# Patient Record
Sex: MALE | Race: WHITE | Employment: OTHER | URBAN - METROPOLITAN AREA
[De-identification: names, ages, dates, MRNs, and addresses within clinical notes are randomized per-mention and may not be internally consistent; named-entity substitution may affect disease eponyms.]

---

## 2017-09-01 ENCOUNTER — HOSPITAL ENCOUNTER (OUTPATIENT)
Dept: OTHER | Age: 67
Discharge: HOME | End: 2017-09-30
Attending: INTERNAL MEDICINE | Admitting: INTERNAL MEDICINE

## 2017-09-09 PROBLEM — F17.200 SMOKER: Status: ACTIVE | Noted: 2017-09-09

## 2017-09-09 PROBLEM — I10 HTN (HYPERTENSION): Status: ACTIVE | Noted: 2017-09-09

## 2017-09-09 PROBLEM — I26.99 PE (PULMONARY THROMBOEMBOLISM) (HCC): Status: ACTIVE | Noted: 2017-09-09

## 2017-09-09 PROBLEM — D75.1 POLYCYTHEMIA: Status: ACTIVE | Noted: 2017-09-09

## 2017-09-14 ENCOUNTER — ANTI-COAG VISIT (OUTPATIENT)
Dept: PHARMACY | Age: 67
End: 2017-09-14

## 2017-09-14 DIAGNOSIS — I26.99 PE (PULMONARY THROMBOEMBOLISM) (HCC): Primary | ICD-10-CM

## 2017-09-14 LAB
INR BLD: 3.2
PROTIME: 38.9 SECONDS

## 2017-09-18 ENCOUNTER — ANTI-COAG VISIT (OUTPATIENT)
Dept: PHARMACY | Age: 67
End: 2017-09-18

## 2017-09-18 DIAGNOSIS — I26.99 PE (PULMONARY THROMBOEMBOLISM) (HCC): ICD-10-CM

## 2017-09-18 LAB — INR BLD: 2.5

## 2017-09-25 ENCOUNTER — ANTI-COAG VISIT (OUTPATIENT)
Dept: PHARMACY | Age: 67
End: 2017-09-25

## 2017-09-25 DIAGNOSIS — I26.99 PE (PULMONARY THROMBOEMBOLISM) (HCC): ICD-10-CM

## 2017-09-25 LAB
INR BLD: 4.2
PROTIME: 50.3 SECONDS

## 2017-10-02 ENCOUNTER — ANTI-COAG VISIT (OUTPATIENT)
Dept: PHARMACY | Age: 67
End: 2017-10-02

## 2017-10-02 DIAGNOSIS — I26.99 PE (PULMONARY THROMBOEMBOLISM) (HCC): ICD-10-CM

## 2017-10-02 LAB
INR BLD: 3
PROTIME: 36.4 SECONDS

## 2017-10-02 NOTE — PROGRESS NOTES
Mr. Acacia Solis is a 61 y.o. y/o male with history of PE   He presents today for anticoagulation monitoring and adjustment. Pertinent PMH: HTN  Patient Reported Findings:  Yes     No  [x]   []       Patient verifies current dosing regimen as listed  []   [x]       S/S bleeding/bruising/swelling/SOB  []   [x]       Blood in urine or stool  []   [x]       Procedures scheduled in the future at this time  []   [x]       Missed Dose  []   [x]       Extra Dose  []   [x]       Change in medications  []   [x]       Change in health/diet/appetite  []   [x]       Change in alcohol use Alcohol in the evening is normally 2 shots of Crown Apple and 1 beer.  []   [x]       Change in activity  [x]   []       Hospital admission from 9/9/17-9/12 for fresh PE  []   [x]       Emergency department visit  []   [x]       Other complaints  [x]   []       Tobacco use--He smokes 1/2 ppd    Clinical Outcomes:  Yes     No  []   [x]       Major bleeding event  []   [x]       Thromboembolic event    Duration of warfarin Therapy: 6 months from 9/11/17 ending approx. 3/11/17  INR Range:  2.0-3.0  Patient states that he does not eat any salads or vegetables because he had the impression that he \"can't have any of the green vegetables\". Instructed patient the key is to be consistent with vegetables, not that he can not have them. INR is 3.0 today   Continue same weekly dose of 2.5mg daily. Patient agrees to add in 2 salads this week. He may be interested in more vegetables since he now realizes he can have them. Recheck INR in 1 week  Consider ordering 2.5mg tablet.     Referring pulmonologist is Dr. Rehana Small  INR (no units)   Date Value   10/02/2017 3   09/25/2017 4.2   09/18/2017 2.5   09/14/2017 3.2

## 2017-10-02 NOTE — MR AVS SNAPSHOT
After Visit Summary             Rangel Holman   10/2/2017 2:15 PM   Anti-coag visit    Description:  Male : 1950   Provider:  YUMIKO Naranjo YOSVANY [facility]   Department:  [address]/[facility] [employer]              Your Follow-Up and Future Appointments         Below is a list of your follow-up and future appointments. This may not be a complete list as you may have made appointments directly with providers that we are not aware of or your providers may have made some for you. Please call your providers to confirm appointments. It is important to keep your appointments. Please bring your current insurance card, photo ID, co-pay, and all medication bottles to your appointment. If self-pay, payment is expected at the time of service. Your To-Do List     Future Appointments Provider Department Dept Phone    10/9/2017 2:15 PM [facility] [phone]         Information from Your Visit        Department     Name Address Phone Fax    [address]/[facility] [address] [place] [address] [phone]      You Were Seen for:         Comments    PE (pulmonary thromboembolism) [facility])   [[id number]]         Anticoagulation Summary as of 10/2/2017              Today's INR 3    Next INR check 10/9/2017      Description           Continue same weekly dose of 2.5 mg (1/2 tablet) daily. Vital Signs     Smoking Status                   Current Every Day Smoker              Medications and Orders      Your Current Medications Are              warfarin (COUMADIN) 5 MG tablet Goal INR 2-3    lisinopril (PRINIVIL;ZESTRIL) 10 MG tablet Take 1 tablet by mouth daily      Allergies           No Known Allergies      We Ordered/Performed the following           Protime-INR     Comments: This external order was created through the results console.          Result Summary for Protime-INR Result Information     Status          Final result (Resulted: 10/2/2017  2:02 PM)           10/2/2017  2:02 PM      Component Results     Component Value Ref Range & Units Status    INR 3  Final    Protime 36.4 seconds Final               Additional Information        Basic Information     Date Of Birth Sex Race Ethnicity Preferred Language    1950 Male White Non-/Non  English      Problem List as of 10/2/2017                 Bilateral pulmonary embolism (HCC)    Pulmonary infarct (HCC)    PE (pulmonary thromboembolism) (HCC)    Polycythemia    HTN (hypertension)    Smoker      Preventive Care        Date Due    Hepatitis C screening is recommended for all adults regardless of risk factors born between [facility] at least once (lifetime) who have never been tested. 1950    HIV screening is recommended for all people regardless of risk factors  aged 15-65 years at least once (lifetime) who have never been HIV tested. 11/16/1967    Tetanus Combination Vaccine (1 - Tdap) 11/16/1971    Pneumococcal Vaccine - Pneumovax for adults aged 19-64 years with: chronic heart disease, chronic lung disease, diabetes mellitus, alcoholism, chronic liver disease, or cigarette smoking. (1 of 1 - PPSV23) 11/16/1971    Cholesterol Screening 11/16/1992    Colonoscopy 11/16/2002    Zoster Vaccine 11/16/2012    Yearly Flu Vaccine (1) 9/1/2017            [employer] Signup           [employer] allows you to send messages to your doctor, view your test results, renew your prescriptions, schedule appointments, view visit notes, and more. How Do I Sign Up? 1. In your Internet browser, go to [email or web address]  2. Click on the Sign Up Now link in the Sign In box. You will see the New Member Sign Up page. 3. Enter your [employer] Access Code exactly as it appears below. You will not need to use this code after youve completed the sign-up process.  If you do not sign up before the expiration date, you must request a new code. [employer] Access Code: [email or web address]  Expires: 11/11/2017 12:28 PM    4. Enter your Social Security Number ([id number]) and Date of Birth (mm/dd/yyyy) as indicated and click Submit. You will be taken to the next sign-up page. 5. Create a [employer] ID. This will be your [employer] login ID and cannot be changed, so think of one that is secure and easy to remember. 6. Create a [employer] password. You can change your password at any time. 7. Enter your Password Reset Question and Answer. This can be used at a later time if you forget your password. 8. Enter your e-mail address. You will receive e-mail notification when new information is available in [address]. 9. Click Sign Up. You can now view your medical record. Additional Information  If you have questions, please contact the physician practice where you receive care. Remember, [employer] is NOT to be used for urgent needs. For medical emergencies, dial 911. For questions regarding your [employer] account call [phone]. If you have a clinical question, please call your doctor's office.         October 2017 Details    Denisse Catherine Nohelia Tue Wed Thu Fri Sat     1               2      2.5 mg   See details      3      2.5 mg         4      2.5 mg         5      2.5 mg         6      2.5 mg         7      2.5 mg           8      2.5 mg         9      2.5 mg         10               11               12               13               14                 15               16               17               18               19               20               21                 22               23               24               25               26               27               28                 29               30               31                    Date Details   10/02 This INR check       Date of next INR:  10/9/2017         How to take your warfarin dose

## 2017-10-09 ENCOUNTER — ANTI-COAG VISIT (OUTPATIENT)
Dept: PHARMACY | Age: 67
End: 2017-10-09

## 2017-10-09 DIAGNOSIS — I26.99 PE (PULMONARY THROMBOEMBOLISM) (HCC): ICD-10-CM

## 2017-10-09 LAB
INR BLD: 2.9
PROTIME: 35.3 SECONDS

## 2017-10-09 NOTE — PROGRESS NOTES
Mr. Kathie Colon is a 61 y.o. y/o male with history of PE   He presents today for anticoagulation monitoring and adjustment. Pertinent PMH: HTN  Patient Reported Findings:  Yes     No  [x]   []       Patient verifies current dosing regimen as listed  []   [x]       S/S bleeding/bruising/swelling/SOB  []   [x]       Blood in urine or stool  []   [x]       Procedures scheduled in the future at this time  []   [x]       Missed Dose  []   [x]       Extra Dose  []   [x]       Change in medications  []   [x]       Change in health/diet/appetite  []   [x]       Change in alcohol use Alcohol in the evening is normally 2 shots of Crown Apple and 1 beer.  []   [x]       Change in activity  [x]   []       Hospital admission from 9/9/17-9/12 for fresh PE  []   [x]       Emergency department visit  []   [x]       Other complaints  [x]   []       Tobacco use--He smokes 1/2 ppd    Clinical Outcomes:  Yes     No  []   [x]       Major bleeding event  []   [x]       Thromboembolic event    Duration of warfarin Therapy: 6 months from 9/11/17 ending approx. 3/11/17  INR Range:  2.0-3.0  Patient states that he does not eat any salads or vegetables because he had the impression that he \"can't have any of the green vegetables\". Instructed patient the key is to be consistent with vegetables, not that he can not have them. INR is 2.9 today   Continue same weekly dose of 2.5mg daily.    Patient agrees to add in 2 salads this week had only been doing 1 salad a week  Recheck INR in 2 weeks, 10/23  Consider ordering 2.5mg tablet (still has a lot of 5 mg tablets left)    Referring pulmonologist is Dr. Dong Sy  INR (no units)   Date Value   10/09/2017 2.9   10/02/2017 3   09/25/2017 4.2   09/18/2017 2.5

## 2017-10-10 ENCOUNTER — TELEPHONE (OUTPATIENT)
Dept: PHARMACY | Age: 67
End: 2017-10-10

## 2017-10-10 NOTE — TELEPHONE ENCOUNTER
Pt called regarding Lisinopril prescription that was prescribed while in patient. He said it was called into [facility] ( confirmed in pt's chart) . [facility] does not have any information on this medication only warfarin. Explained to pt I would try to contact the MD ( Dr Eligio Pastor) to see what happened. Left v/m on hospitalist line asking to call pt. Spoke to pt he actually had Lisinopril filled at the [facility] OP Rx  while in house at [facility] . Advised him to call the [facility] OP pharmacy ( gave him their number ) and have them refill the prescription or transfer to . Salima Delacruz.

## 2017-10-23 ENCOUNTER — ANTI-COAG VISIT (OUTPATIENT)
Dept: PHARMACY | Age: 67
End: 2017-10-23

## 2017-10-23 DIAGNOSIS — I26.99 PE (PULMONARY THROMBOEMBOLISM) (HCC): ICD-10-CM

## 2017-10-23 LAB
INR BLD: 2.2
PROTIME: 26.6 SECONDS

## 2017-11-01 ENCOUNTER — HOSPITAL ENCOUNTER (OUTPATIENT)
Dept: OTHER | Age: 67
Discharge: HOME | End: 2017-11-30
Attending: INTERNAL MEDICINE | Admitting: INTERNAL MEDICINE

## 2017-11-13 ENCOUNTER — ANTI-COAG VISIT (OUTPATIENT)
Dept: PHARMACY | Age: 67
End: 2017-11-13

## 2017-11-13 DIAGNOSIS — I26.99 PE (PULMONARY THROMBOEMBOLISM) (HCC): ICD-10-CM

## 2017-11-13 LAB
INR BLD: 2.9
PROTIME: 35.2 SECONDS

## 2017-12-01 ENCOUNTER — HOSPITAL ENCOUNTER (OUTPATIENT)
Dept: OTHER | Age: 67
Discharge: HOME | End: 2017-12-31
Attending: INTERNAL MEDICINE | Admitting: INTERNAL MEDICINE

## 2017-12-11 ENCOUNTER — ANTI-COAG VISIT (OUTPATIENT)
Dept: PHARMACY | Age: 67
End: 2017-12-11

## 2017-12-11 DIAGNOSIS — I26.99 PE (PULMONARY THROMBOEMBOLISM) (HCC): ICD-10-CM

## 2017-12-11 LAB
INR BLD: 2.2
PROTIME: 25.9 SECONDS

## 2017-12-11 RX ORDER — WARFARIN SODIUM 2.5 MG/1
2.5 TABLET ORAL DAILY
COMMUNITY
End: 2021-03-23 | Stop reason: DRUGHIGH

## 2017-12-11 NOTE — PROGRESS NOTES
Mr. Fredy Velazquez is a 74 y.o. y/o male with history of PE   He presents today for anticoagulation monitoring and adjustment. Pertinent PMH: HTN  Patient Reported Findings:  Yes     No  [x]   []       Patient verifies current dosing regimen as listed  []   [x]       S/S bleeding/bruising/swelling/SOB  []   [x]       Blood in urine or stool  []   [x]       Procedures scheduled in the future at this time  []   [x]       Missed Dose  []   [x]       Extra Dose  []   [x]       Change in medications  [x]   []       Change in health/diet/appetite- normally eats salad twice a week, but has not been eating vegetables the past few weeks   []   [x]       Change in alcohol use Alcohol in the evening is normally 2 shots of Crown Apple and 1 beer.  []   [x]       Change in activity  []   [x]       Hospital admission   []   [x]       Emergency department visit  []   [x]       Other complaints  [x]   []       Tobacco use--He smokes 1/2 ppd    Clinical Outcomes:  Yes     No  []   [x]       Major bleeding event  []   [x]       Thromboembolic event    Duration of warfarin Therapy: 6 months from 9/11/17 ending approx. 3/11/17  INR Range:  2.0-3.0    INR is 2.2 today   Continue same weekly dose of 2.5mg daily.    Recheck INR in 4 weeks, 1/8  Consider ordering 2.5mg tablet (still has a lot of 5 mg tablets left)    Referring pulmonologist is Dr. Emeterio Huber  INR (no units)   Date Value   12/11/2017 2.2   11/13/2017 2.9   10/23/2017 2.2   10/09/2017 2.9

## 2017-12-27 PROBLEM — J44.9 COPD (CHRONIC OBSTRUCTIVE PULMONARY DISEASE) (HCC): Status: ACTIVE | Noted: 2017-12-27

## 2017-12-27 PROBLEM — E87.1 HYPONATREMIA: Status: ACTIVE | Noted: 2017-12-27

## 2017-12-27 PROBLEM — D72.829 LEUKOCYTOSIS: Status: ACTIVE | Noted: 2017-12-27

## 2017-12-27 PROBLEM — K81.0 ACUTE CHOLECYSTITIS: Status: ACTIVE | Noted: 2017-12-27

## 2017-12-27 PROBLEM — E86.0 DEHYDRATION: Status: ACTIVE | Noted: 2017-12-27

## 2018-01-01 ENCOUNTER — HOSPITAL ENCOUNTER (OUTPATIENT)
Dept: OTHER | Age: 68
Discharge: HOME | End: 2018-01-31
Attending: INTERNAL MEDICINE | Admitting: INTERNAL MEDICINE

## 2018-01-08 ENCOUNTER — ANTI-COAG VISIT (OUTPATIENT)
Dept: PHARMACY | Age: 68
End: 2018-01-08

## 2018-01-08 DIAGNOSIS — I26.99 PE (PULMONARY THROMBOEMBOLISM) (HCC): ICD-10-CM

## 2018-01-08 LAB
INR BLD: 1.3
PROTIME: 15.9 SECONDS

## 2018-01-19 ENCOUNTER — OFFICE VISIT (OUTPATIENT)
Dept: PULMONOLOGY | Age: 68
End: 2018-01-19

## 2018-01-19 VITALS
SYSTOLIC BLOOD PRESSURE: 179 MMHG | RESPIRATION RATE: 18 BRPM | HEART RATE: 83 BPM | WEIGHT: 123 LBS | DIASTOLIC BLOOD PRESSURE: 97 MMHG | HEIGHT: 67 IN | BODY MASS INDEX: 19.3 KG/M2 | OXYGEN SATURATION: 99 %

## 2018-01-19 DIAGNOSIS — J44.9 COPD, SEVERITY TO BE DETERMINED (HCC): ICD-10-CM

## 2018-01-19 DIAGNOSIS — D75.1 POLYCYTHEMIA: ICD-10-CM

## 2018-01-19 DIAGNOSIS — Z72.0 NICOTINE ABUSE: ICD-10-CM

## 2018-01-19 DIAGNOSIS — I26.99 PE (PULMONARY THROMBOEMBOLISM) (HCC): ICD-10-CM

## 2018-01-19 DIAGNOSIS — Z09 HOSPITAL DISCHARGE FOLLOW-UP: Primary | ICD-10-CM

## 2018-01-19 PROCEDURE — 99214 OFFICE O/P EST MOD 30 MIN: CPT | Performed by: INTERNAL MEDICINE

## 2018-01-19 NOTE — PROGRESS NOTES
HPI: Hospital follow-up, pulmonary embolus, COPD  Patient was seen during hospital stay in September 2017 later in December  Patient was diagnosed with acute pulmonary embolus in September and was started on Coumadin  He was found to have parasitemia which thought to be due to chronic hypoxia  Patient also have a drinker  Patient required lap cholecystectomy in December and he was seen again preop and did well postoperatively  He is on room air and feels well  He has been on Coumadin since September and managed by the Coumadin clinic here  He denies any shortness of breath or dyspnea on exertion  He denies any productive cough, diaphoresis or hemoptysis  No prior pulmonary function test  CTA in September 2017 was read as  EXAMINATION:   CTA OF THE CHEST 9/9/2017 2:16 pm       TECHNIQUE:   CTA of the chest was performed after the administration of intravenous   contrast.  Multiplanar reformatted images are provided for review.  MIP   images are provided for review. Dose modulation, iterative reconstruction,   and/or weight based adjustment of the mA/kV was utilized to reduce the   radiation dose to as low as reasonably achievable.       COMPARISON:   None.       HISTORY:   ORDERING PHYSICIAN PROVIDED HISTORY: chest pain   TECHNOLOGIST PROVIDED HISTORY:   Technologist Provided Reason for Exam: Rib Pain  pt c/o pain left lower ribs   x 3 days. Worse with deep breath, cough.  Denies injury, fever   Acuity: Acute   Type of Encounter: Initial       History of polycythemia       FINDINGS:   Pulmonary Arteries: Pulmonary arteries are adequately opacified for   evaluation.  There are bilateral central pulmonary arterial emboli which   extend into many of the segmental and subsegmental, most extensive in the   lower lobes, greater on the left.  Main pulmonary artery is within normal   limits in caliber.       RV/LV ratio is approximately 0.9.  There is no reflux of contrast into the   intrahepatic inferior vena cava.     Mediastinum: No mediastinal adenopathy.  Coronary arterial and aortic   calcifications.  No aortic dissection.       Lungs/pleura: The central airways are patent.  Calcified posterior right   lower lobe granulomas are present. Belinda Child is peripheral posterior left lower   lobe airspace disease associated with a tiny left pleural effusion.       Upper Abdomen: Limited images the upper abdomen show a large 1.9 cm stone   within the gallbladder lumen.  Although the gallbladder not particularly   distended; there is some ill definition of the right upper quadrant   pericholecystic fat.       There is moderate to severe narrowing of the superior mesenteric artery by   noncalcified mural thrombus.       Soft Tissues/Bones: No acute or focal bony abnormality.           Impression   Bilateral pulmonary emboli including central emboli extending into segmental   and subsegmental branches.  RV/LV ratio is approximately 0.9.       Left lower lobe pulmonary infarct, trace left pleural effusion.       Moderate to severe narrowing of the proximal superior mesenteric artery by   noncalcified plaque.  A dedicated CT a of the abdominal aorta is recommended.       Cholelithiasis. Belinda Child is ill definition of the pericholecystic right upper   quadrant mesenteric fat.  Correlation for acute and/or chronic cholecystitis   is recommended. Past Medical History:   Diagnosis Date    ETOH abuse     drinks 3-4 beers and 2 shots of liquor per day    HTN (hypertension) 09/09/2017    Started Lisinopril 10 mg daily 9/10/17    Pneumonia involving left lung 09/09/2017    Polycythemia 09/09/2017    Pulmonary embolism, bilateral ([address] 09/09/2017    Tobacco abuse     1 pack per day     Current Outpatient Prescriptions   Medication Sig Dispense Refill    warfarin (COUMADIN) 2.5 MG tablet Take 2.5 mg by mouth daily       No current facility-administered medications for this visit. Review of Systems   Constitutional: Negative. tracheal deviation present. No thyromegaly present. Cardiovascular: Normal rate, regular rhythm, S1&S2 and intact distal pulses. Pulmonary/Chest: Effort normal and breath sounds normal. No stridor. No respiratory distress. No wheezes, rhonchi, rales or tenderness. Abdominal: Soft. Bowel sounds are normal, no shifting dullness, no distension and no mass. No tenderness. No rebound and no guarding. Musculoskeletal: Normal range of motion. No edema and no tenderness. Lymphadenopathy:    No cervical adenopathy. No axillary adenopathy. Neurological: Alert and oriented. Normal reflexes. No cranial nerve deficit. Normal muscle tone. Coordination normal.   Skin: Skin is warm and dry. No rash noted. No erythema. Psychiatric: Normal mood and affect. Behavior is normal. Thought content normal.        Assessment:    1. Hospital discharge follow-up     2. Polycythemia     3. PE (pulmonary thromboembolism) ([facility]     4. COPD, severity to be determined ([facility]     5.  Nicotine abuse             Plan:  · I discussed with patient the above diagnosis in details and reviewed his recent hospital stay and related test results  · I recommended continuing Coumadin treatment and follow-up with hematology  · I will order full pulmonary function tests for evaluation of his COPD  · He denies respiratory symptoms at this time with good functional status  · I encouraged him to quit smoking  · Return to see in 3 months

## 2018-01-22 ENCOUNTER — ANTI-COAG VISIT (OUTPATIENT)
Dept: PHARMACY | Age: 68
End: 2018-01-22

## 2018-01-22 DIAGNOSIS — I26.99 PE (PULMONARY THROMBOEMBOLISM) (HCC): ICD-10-CM

## 2018-01-22 LAB
INR BLD: 2.2
PROTIME: 25.9 SECONDS

## 2018-02-01 ENCOUNTER — HOSPITAL ENCOUNTER (OUTPATIENT)
Dept: OTHER | Age: 68
Discharge: HOME | End: 2018-02-28
Attending: INTERNAL MEDICINE | Admitting: INTERNAL MEDICINE

## 2018-02-05 ENCOUNTER — ANTI-COAG VISIT (OUTPATIENT)
Dept: PHARMACY | Age: 68
End: 2018-02-05

## 2018-02-05 DIAGNOSIS — I26.99 PE (PULMONARY THROMBOEMBOLISM) (HCC): ICD-10-CM

## 2018-02-05 LAB
INR BLD: 1.8
PROTIME: 21.6 SECONDS

## 2018-02-19 ENCOUNTER — ANTI-COAG VISIT (OUTPATIENT)
Dept: PHARMACY | Age: 68
End: 2018-02-19

## 2018-02-19 DIAGNOSIS — I26.99 PE (PULMONARY THROMBOEMBOLISM) (HCC): ICD-10-CM

## 2018-02-19 LAB
INR BLD: 2
PROTIME: 23.5 SECONDS

## 2018-02-19 NOTE — PROGRESS NOTES
Mr. Aparna De Leon is a 74 y.o. y/o male with history of PE   He presents today for anticoagulation monitoring and adjustment. Pertinent PMH: HTN  Patient Reported Findings:  Yes     No  [x]   []       Patient verifies current dosing regimen as listed- uses the paperwork to check off each time takes warfarin   []   [x]       S/S bleeding/bruising/swelling/SOB  []   [x]       Blood in urine or stool  []   [x]       Procedures scheduled in the future at this time  []   [x]       Missed Dose  []   [x]       Extra Dose  []   [x]       Change in medications  [x]   []       Change in health/diet/appetite-Has green beans and potatoes almost every day. States that he has been eating asparagus and salads in addition to normal diet. []   [x]       Change in alcohol use Alcohol in the evening is normally 2 shots of Crown Apple and 1 beer.  []   [x]       Change in activity  []   [x]       Hospital admission     []   [x]       Emergency department visit  []   [x]       Other complaints  [x]   []       Tobacco use--He smokes 1/2 ppd    Clinical Outcomes:  Yes     No  []   [x]       Major bleeding event  []   [x]       Thromboembolic event    Duration of warfarin Therapy: 6 months from 9/11/17 ending approx. 3/11/17. Will See referring physician Dr. Kelvin Osei, on 4/19/18 to discuss continued therapy  INR Range:  2.0-3.0    INR is 2 today despite dose increase, and d/t inc vit k   Continue weekly dose of 3.75mg on Mon & Fri and 2.5 mg all other days  Recheck INR in 3 weeks, 3/12.     Referring pulmonologist is Dr. Kelvin Osei  INR (no units)   Date Value   02/19/2018 2   02/05/2018 1.8   01/22/2018 2.2   01/08/2018 1.3

## 2018-03-01 ENCOUNTER — HOSPITAL ENCOUNTER (OUTPATIENT)
Dept: OTHER | Age: 68
Discharge: HOME | End: 2018-03-31
Attending: INTERNAL MEDICINE | Admitting: INTERNAL MEDICINE

## 2018-03-12 ENCOUNTER — ANTI-COAG VISIT (OUTPATIENT)
Dept: PHARMACY | Age: 68
End: 2018-03-12

## 2018-03-12 DIAGNOSIS — I26.99 PE (PULMONARY THROMBOEMBOLISM) (HCC): ICD-10-CM

## 2018-03-12 LAB
INR BLD: 1.9
PROTIME: 22.5 SECONDS

## 2018-03-26 ENCOUNTER — ANTI-COAG VISIT (OUTPATIENT)
Dept: PHARMACY | Age: 68
End: 2018-03-26

## 2018-03-26 DIAGNOSIS — I26.99 PE (PULMONARY THROMBOEMBOLISM) (HCC): ICD-10-CM

## 2018-03-26 LAB
INR BLD: 2.2
PROTIME: 25.8 SECONDS

## 2018-03-26 NOTE — PROGRESS NOTES
Mr. Melanie Prieto is a 74 y.o. y/o male with history of PE   He presents today for anticoagulation monitoring and adjustment. Pertinent PMH: HTN  Patient Reported Findings:  Yes     No  [x]   []       Patient verifies current dosing regimen as listed- uses the paperwork to check off each time takes warfarin   []   [x]       S/S bleeding/bruising/swelling/SOB  []   [x]       Blood in urine or stool  []   [x]       Procedures scheduled in the future at this time  []   [x]       Missed Dose  []   [x]       Extra Dose  []   [x]       Change in medications  []   [x]       Change in health/diet/appetite-Has green beans and potatoes almost every day. States that he has been eating asparagus and salads in addition to normal diet. []   [x]       Change in alcohol use Alcohol in the evening is normally 2 shots of Crown Apple and 1 beer.  []   [x]       Change in activity  []   [x]       Hospital admission     []   [x]       Emergency department visit  []   [x]       Other complaints  [x]   []       Tobacco use--He smokes 1/2 ppd    Clinical Outcomes:  Yes     No  []   [x]       Major bleeding event  []   [x]       Thromboembolic event    Duration of warfarin Therapy: 6 months from 9/11/17 ending approx. 3/11/17. Will See referring physician Dr. Osvaldo López, on 4/19/18 to discuss continued therapy  INR Range:  2.0-3.0    INR is 2.2 today   Continue weekly dose of 3.75mg on Mon Wed & Fri and 2.5 mg all other days  Called refill in to op pharmacy  Advised that if Dr. Osvaldo López d/c warfarin at f/u appt to contact clinic and will cancel appt  Recheck INR in 4 weeks, 4/23.     Referring pulmonologist is Dr. Osvaldo López  INR (no units)   Date Value   03/26/2018 2.2   03/12/2018 1.9   02/19/2018 2   02/05/2018 1.8

## 2018-04-01 ENCOUNTER — HOSPITAL ENCOUNTER (OUTPATIENT)
Dept: OTHER | Age: 68
Discharge: HOME | End: 2018-04-30
Attending: INTERNAL MEDICINE | Admitting: INTERNAL MEDICINE

## 2018-04-11 PROBLEM — E86.0 DEHYDRATION: Status: RESOLVED | Noted: 2017-12-27 | Resolved: 2018-04-11

## 2018-04-19 ENCOUNTER — TELEPHONE (OUTPATIENT)
Dept: PULMONOLOGY | Age: 68
End: 2018-04-19

## 2018-04-19 ENCOUNTER — OFFICE VISIT (OUTPATIENT)
Dept: PULMONOLOGY | Age: 68
End: 2018-04-19

## 2018-04-19 VITALS
SYSTOLIC BLOOD PRESSURE: 202 MMHG | HEART RATE: 85 BPM | WEIGHT: 131 LBS | RESPIRATION RATE: 18 BRPM | OXYGEN SATURATION: 98 % | DIASTOLIC BLOOD PRESSURE: 110 MMHG | HEIGHT: 67 IN | BODY MASS INDEX: 20.56 KG/M2

## 2018-04-19 DIAGNOSIS — Z72.0 NICOTINE ABUSE: ICD-10-CM

## 2018-04-19 DIAGNOSIS — I26.99 BILATERAL PULMONARY EMBOLISM (HCC): Primary | ICD-10-CM

## 2018-04-19 DIAGNOSIS — D75.1 POLYCYTHEMIA: ICD-10-CM

## 2018-04-19 DIAGNOSIS — J44.9 COPD, SEVERITY TO BE DETERMINED (HCC): ICD-10-CM

## 2018-04-19 PROCEDURE — 99213 OFFICE O/P EST LOW 20 MIN: CPT | Performed by: INTERNAL MEDICINE

## 2018-04-23 ENCOUNTER — ANTI-COAG VISIT (OUTPATIENT)
Dept: PHARMACY | Age: 68
End: 2018-04-23

## 2018-04-23 DIAGNOSIS — I26.99 PE (PULMONARY THROMBOEMBOLISM) (HCC): ICD-10-CM

## 2018-04-23 LAB
INR BLD: 2.3
PROTIME: 27.1 SECONDS

## 2018-05-01 ENCOUNTER — HOSPITAL ENCOUNTER (OUTPATIENT)
Dept: OTHER | Age: 68
Discharge: HOME | End: 2018-05-31
Attending: INTERNAL MEDICINE | Admitting: INTERNAL MEDICINE

## 2018-05-21 ENCOUNTER — ANTI-COAG VISIT (OUTPATIENT)
Dept: PHARMACY | Age: 68
End: 2018-05-21

## 2018-05-21 DIAGNOSIS — I26.99 PE (PULMONARY THROMBOEMBOLISM) (HCC): ICD-10-CM

## 2018-05-21 LAB
INR BLD: 2
PROTIME: 24.3 SECONDS

## 2018-05-22 ENCOUNTER — TELEPHONE (OUTPATIENT)
Dept: PHARMACY | Age: 68
End: 2018-05-22

## 2018-06-01 ENCOUNTER — HOSPITAL ENCOUNTER (OUTPATIENT)
Dept: OTHER | Age: 68
Discharge: HOME | End: 2018-06-30
Attending: INTERNAL MEDICINE | Admitting: INTERNAL MEDICINE

## 2018-06-18 ENCOUNTER — ANTI-COAG VISIT (OUTPATIENT)
Dept: PHARMACY | Age: 68
End: 2018-06-18

## 2018-06-18 DIAGNOSIS — I26.99 PE (PULMONARY THROMBOEMBOLISM) (HCC): ICD-10-CM

## 2018-06-18 LAB
INR BLD: 2.6
PROTIME: 31.1 SECONDS

## 2018-07-01 ENCOUNTER — HOSPITAL ENCOUNTER (OUTPATIENT)
Dept: OTHER | Age: 68
Discharge: HOME | End: 2018-07-31
Attending: INTERNAL MEDICINE | Admitting: INTERNAL MEDICINE

## 2018-07-16 ENCOUNTER — ANTI-COAG VISIT (OUTPATIENT)
Dept: PHARMACY | Age: 68
End: 2018-07-16

## 2018-07-16 DIAGNOSIS — I26.99 PE (PULMONARY THROMBOEMBOLISM) (HCC): ICD-10-CM

## 2018-07-16 LAB
INR BLD: 3.2
PROTIME: 38.2 SECONDS

## 2018-08-01 ENCOUNTER — HOSPITAL ENCOUNTER (OUTPATIENT)
Dept: OTHER | Age: 68
Discharge: HOME | End: 2018-08-31
Attending: INTERNAL MEDICINE | Admitting: INTERNAL MEDICINE

## 2018-08-13 ENCOUNTER — ANTI-COAG VISIT (OUTPATIENT)
Dept: PHARMACY | Age: 68
End: 2018-08-13

## 2018-08-13 DIAGNOSIS — I26.99 PE (PULMONARY THROMBOEMBOLISM) (HCC): ICD-10-CM

## 2018-08-13 LAB
INR BLD: 3
PROTIME: 35.8 SECONDS

## 2018-09-01 ENCOUNTER — HOSPITAL ENCOUNTER (OUTPATIENT)
Dept: OTHER | Age: 68
Discharge: HOME | End: 2018-09-01
Attending: INTERNAL MEDICINE | Admitting: INTERNAL MEDICINE

## 2018-09-10 ENCOUNTER — ANTI-COAG VISIT (OUTPATIENT)
Dept: PHARMACY | Age: 68
End: 2018-09-10

## 2018-09-10 DIAGNOSIS — I26.99 PE (PULMONARY THROMBOEMBOLISM) (HCC): ICD-10-CM

## 2018-09-10 LAB
INR BLD: 4.1
PROTIME: 48.9 SECONDS

## 2018-09-27 ENCOUNTER — ANTI-COAG VISIT (OUTPATIENT)
Dept: PHARMACY | Age: 68
End: 2018-09-27
Payer: MEDICARE

## 2018-09-27 DIAGNOSIS — I26.99 PE (PULMONARY THROMBOEMBOLISM) (HCC): ICD-10-CM

## 2018-09-27 LAB — INTERNATIONAL NORMALIZATION RATIO, POC: 4

## 2018-09-27 PROCEDURE — 85610 PROTHROMBIN TIME: CPT

## 2018-09-27 PROCEDURE — 99212 OFFICE O/P EST SF 10 MIN: CPT

## 2018-09-27 NOTE — TELEPHONE ENCOUNTER
Warfarin prescription phoned into [facility] to [address] [facility] under Dr. Giovanni Conde  Warfarin 2.5 mg tabs  Take 3.75 mg on Mon and 2.5 mg all other days of the week  90 days   2 refills

## 2018-09-28 NOTE — PROGRESS NOTES
Jak Pereira from Dr. Bridget Arriaga office states that per last pt's visit on 9/24/2018, warfarin therapy was changed to indefinitely. Faxed blank collaborative to be filled out, signed and returned to clinic to MD office.

## 2018-10-11 ENCOUNTER — ANTI-COAG VISIT (OUTPATIENT)
Dept: PHARMACY | Age: 68
End: 2018-10-11
Payer: MEDICARE

## 2018-10-11 DIAGNOSIS — I26.99 PE (PULMONARY THROMBOEMBOLISM) (HCC): ICD-10-CM

## 2018-10-11 LAB — INTERNATIONAL NORMALIZATION RATIO, POC: 2.5

## 2018-10-11 PROCEDURE — 99211 OFF/OP EST MAY X REQ PHY/QHP: CPT

## 2018-10-11 PROCEDURE — 85610 PROTHROMBIN TIME: CPT

## 2018-11-01 ENCOUNTER — ANTI-COAG VISIT (OUTPATIENT)
Dept: PHARMACY | Age: 68
End: 2018-11-01
Payer: MEDICARE

## 2018-11-01 DIAGNOSIS — I26.99 PE (PULMONARY THROMBOEMBOLISM) (HCC): ICD-10-CM

## 2018-11-01 LAB — INTERNATIONAL NORMALIZATION RATIO, POC: 3.2

## 2018-11-01 PROCEDURE — 85610 PROTHROMBIN TIME: CPT

## 2018-11-01 PROCEDURE — 99211 OFF/OP EST MAY X REQ PHY/QHP: CPT

## 2018-11-29 ENCOUNTER — ANTI-COAG VISIT (OUTPATIENT)
Dept: PHARMACY | Age: 68
End: 2018-11-29
Payer: MEDICARE

## 2018-11-29 DIAGNOSIS — I26.99 PE (PULMONARY THROMBOEMBOLISM) (HCC): ICD-10-CM

## 2018-11-29 LAB — INTERNATIONAL NORMALIZATION RATIO, POC: 2.9

## 2018-11-29 PROCEDURE — 85610 PROTHROMBIN TIME: CPT

## 2018-11-29 PROCEDURE — 99211 OFF/OP EST MAY X REQ PHY/QHP: CPT

## 2018-12-27 ENCOUNTER — ANTI-COAG VISIT (OUTPATIENT)
Dept: PHARMACY | Age: 68
End: 2018-12-27
Payer: MEDICARE

## 2018-12-27 DIAGNOSIS — I26.99 PE (PULMONARY THROMBOEMBOLISM) (HCC): ICD-10-CM

## 2018-12-27 LAB — INTERNATIONAL NORMALIZATION RATIO, POC: 3.1

## 2018-12-27 PROCEDURE — 99211 OFF/OP EST MAY X REQ PHY/QHP: CPT

## 2018-12-27 PROCEDURE — 85610 PROTHROMBIN TIME: CPT

## 2018-12-27 NOTE — PROGRESS NOTES
Mr. Hiren Neumann is a 79 y.o. y/o male with history of PE   He presents today for anticoagulation monitoring and adjustment. Pertinent PMH: HTN  Patient Reported Findings:  Yes     No  [x]   []       Patient verifies current dosing regimen as listed- uses the paperwork to check off each time takes warfarin   []   [x]       S/S bleeding/bruising/swelling/SOB  []   [x]       Blood in urine or stool  []   [x]       Procedures scheduled in the future at this time   []   [x]       Missed Dose  []   [x]       Extra Dose  []   [x]       Change in medications  []   [x]       Change in health/diet/appetite-Has green beans and potatoes almost every day. Still eating salads in addition to normal diet.   []   [x]       Change in alcohol use Alcohol in the evening is normally 2 shots of Crown Apple and 1 beer.   []   [x]       Change in activity  []   [x]       Hospital admission     []   [x]       Emergency department visit  []   [x]       Other complaints  []   [x]       Tobacco use--He smokes 1/2 ppd    Clinical Outcomes:  Yes     No  []   [x]       Major bleeding event  []   [x]       Thromboembolic event    Duration of warfarin Therapy: indefinitely  INR Range:  2.0-3.0    INR is 3.1 today  Continue weekly dose of 3.75mg on Mon and 2.5 mg all other days   Refilled warfarin at op pharmacy  Recheck INR in 4 weeks, 1/24    Referring is Dr. Tyesha Ingram  INR (no units)   Date Value   12/27/2018 3.1   11/29/2018 2.9   11/01/2018 3.2   10/11/2018 2.5   09/10/2018 4.1   08/13/2018 3   07/16/2018 3.2   06/18/2018 2.6

## 2019-01-24 ENCOUNTER — ANTI-COAG VISIT (OUTPATIENT)
Dept: PHARMACY | Age: 69
End: 2019-01-24
Payer: MEDICARE

## 2019-01-24 DIAGNOSIS — I26.99 PE (PULMONARY THROMBOEMBOLISM) (HCC): ICD-10-CM

## 2019-01-24 LAB — INTERNATIONAL NORMALIZATION RATIO, POC: 2.1

## 2019-01-24 PROCEDURE — 85610 PROTHROMBIN TIME: CPT

## 2019-01-24 PROCEDURE — 99211 OFF/OP EST MAY X REQ PHY/QHP: CPT

## 2019-02-21 ENCOUNTER — ANTI-COAG VISIT (OUTPATIENT)
Dept: PHARMACY | Age: 69
End: 2019-02-21
Payer: MEDICARE

## 2019-02-21 DIAGNOSIS — I26.99 PE (PULMONARY THROMBOEMBOLISM) (HCC): ICD-10-CM

## 2019-02-21 LAB — INTERNATIONAL NORMALIZATION RATIO, POC: 2.4

## 2019-02-21 PROCEDURE — 85610 PROTHROMBIN TIME: CPT

## 2019-02-21 PROCEDURE — 99211 OFF/OP EST MAY X REQ PHY/QHP: CPT

## 2019-03-28 ENCOUNTER — ANTI-COAG VISIT (OUTPATIENT)
Dept: PHARMACY | Age: 69
End: 2019-03-28
Payer: MEDICARE

## 2019-03-28 DIAGNOSIS — I26.99 PE (PULMONARY THROMBOEMBOLISM) (HCC): ICD-10-CM

## 2019-03-28 LAB — INTERNATIONAL NORMALIZATION RATIO, POC: 3.5

## 2019-03-28 PROCEDURE — 99212 OFFICE O/P EST SF 10 MIN: CPT

## 2019-03-28 PROCEDURE — 85610 PROTHROMBIN TIME: CPT

## 2019-04-18 ENCOUNTER — ANTI-COAG VISIT (OUTPATIENT)
Dept: PHARMACY | Age: 69
End: 2019-04-18
Payer: MEDICARE

## 2019-04-18 DIAGNOSIS — I26.99 PE (PULMONARY THROMBOEMBOLISM) (HCC): ICD-10-CM

## 2019-04-18 LAB — INTERNATIONAL NORMALIZATION RATIO, POC: 3.7

## 2019-04-18 PROCEDURE — 99212 OFFICE O/P EST SF 10 MIN: CPT

## 2019-04-18 PROCEDURE — 85610 PROTHROMBIN TIME: CPT

## 2019-04-18 NOTE — PROGRESS NOTES
Mr. Jaqueline Braun is a 79 y.o. y/o male with history of PE   He presents today for anticoagulation monitoring and adjustment. Pertinent PMH: HTN  Patient Reported Findings:  Yes     No  [x]   []       Patient verifies current dosing regimen as listed- uses the paperwork to check off each time takes warfarin   []   [x]       S/S bleeding/bruising/swelling/SOB  []   [x]       Blood in urine or stool  []   [x]       Procedures scheduled in the future at this time   []   [x]       Missed Dose  []   [x]       Extra Dose  []   [x]       Change in medications  [x]   []       Change in health/diet/appetite-Has green beans and potatoes almost every day/ has returned to eating salads twice a week   []   [x]       Change in alcohol use Alcohol in the evening is normally 2 shots of Crown Apple and 1 beer.   []   [x]       Change in activity  []   [x]       Hospital admission     []   [x]       Emergency department visit  []   [x]       Other complaints  []   [x]       Tobacco use--He smokes 1/2 ppd    Clinical Outcomes:  Yes     No  []   [x]       Major bleeding event  []   [x]       Thromboembolic event    Duration of warfarin Therapy: indefinitely  INR Range:  2.0-3.0    INR is 3.7 today    Since still supratherapeutic, hold dose tomorrow then decrease weekly dose to 2.5 mg daily (6.7% dec)  Encouraged to maintain a consistency of vegetables/salads.   Recheck INR in 2 weeks, 5/2    Referring is Dr. Pauline Mendez  INR (no units)   Date Value   04/18/2019 3.7   03/28/2019 3.5   02/21/2019 2.4   01/24/2019 2.1   09/10/2018 4.1   08/13/2018 3   07/16/2018 3.2   06/18/2018 2.6

## 2019-05-02 ENCOUNTER — ANTI-COAG VISIT (OUTPATIENT)
Dept: PHARMACY | Age: 69
End: 2019-05-02
Payer: MEDICARE

## 2019-05-02 DIAGNOSIS — I26.99 PE (PULMONARY THROMBOEMBOLISM) (HCC): ICD-10-CM

## 2019-05-02 LAB — INTERNATIONAL NORMALIZATION RATIO, POC: 2.9

## 2019-05-02 PROCEDURE — 85610 PROTHROMBIN TIME: CPT

## 2019-05-02 PROCEDURE — 99211 OFF/OP EST MAY X REQ PHY/QHP: CPT

## 2019-05-02 NOTE — PROGRESS NOTES
Mr. Jose Finney is a 79 y.o. y/o male with history of PE   He presents today for anticoagulation monitoring and adjustment. Pertinent PMH: HTN  Patient Reported Findings:  Yes     No  [x]   []       Patient verifies current dosing regimen as listed- uses the paperwork to check off each time takes warfarin   []   [x]       S/S bleeding/bruising/swelling/SOB  []   [x]       Blood in urine or stool  []   [x]       Procedures scheduled in the future at this time   []   [x]       Missed Dose  []   [x]       Extra Dose  []   [x]       Change in medications  []   [x]       Change in health/diet/appetite-Has green beans and potatoes almost every day/ has returned to eating salads twice a week    []   [x]       Change in alcohol use Alcohol in the evening is normally 2 shots of Crown Apple and 1 beer.   []   [x]       Change in activity  []   [x]       Hospital admission     []   [x]       Emergency department visit  []   [x]       Other complaints  []   [x]       Tobacco use--He smokes 1/2 ppd    Clinical Outcomes:  Yes     No  []   [x]       Major bleeding event  []   [x]       Thromboembolic event    Duration of warfarin Therapy: indefinitely  INR Range:  2.0-3.0    INR is 2.9 today    Continue weekly dose of 2.5 mg daily  Encouraged to maintain a consistency of vegetables/salads.   Recheck INR in 3 weeks, 5/23    Referring is Dr. Maikol Hinds  INR (no units)   Date Value   04/18/2019 3.7   03/28/2019 3.5   02/21/2019 2.4   01/24/2019 2.1   09/10/2018 4.1   08/13/2018 3   07/16/2018 3.2   06/18/2018 2.6

## 2019-05-23 ENCOUNTER — ANTI-COAG VISIT (OUTPATIENT)
Dept: PHARMACY | Age: 69
End: 2019-05-23
Payer: MEDICARE

## 2019-05-23 DIAGNOSIS — I26.99 PE (PULMONARY THROMBOEMBOLISM) (HCC): ICD-10-CM

## 2019-05-23 LAB — INTERNATIONAL NORMALIZATION RATIO, POC: 3.6

## 2019-05-23 PROCEDURE — 99211 OFF/OP EST MAY X REQ PHY/QHP: CPT

## 2019-05-23 PROCEDURE — 85610 PROTHROMBIN TIME: CPT

## 2019-05-23 NOTE — PROGRESS NOTES
Mr. Kyle Ch is a 79 y.o. y/o male with history of PE   He presents today for anticoagulation monitoring and adjustment. Pertinent PMH: HTN  Patient Reported Findings:  Yes     No  [x]   []       Patient verifies current dosing regimen as listed- uses the paperwork to check off each time takes warfarin   []   [x]       S/S bleeding/bruising/swelling/SOB  []   [x]       Blood in urine or stool  []   [x]       Procedures scheduled in the future at this time   []   [x]       Missed Dose  []   [x]       Extra Dose  []   [x]       Change in medications  []   [x]       Change in health/diet/appetite- Has green beans and potatoes almost every day/ has returned to eating salads twice a week    []   [x]       Change in alcohol use Alcohol in the evening is normally 2 shots of Crown Apple and 1 beer.   []   [x]       Change in activity  []   [x]       Hospital admission     []   [x]       Emergency department visit  []   [x]       Other complaints  []   [x]       Tobacco use--He smokes 1/2 ppd    Clinical Outcomes:  Yes     No  []   [x]       Major bleeding event  []   [x]       Thromboembolic event    Duration of warfarin Therapy: indefinitely  INR Range:  2.0-3.0    INR is 3.6 today    Since returned to supratherapeutic, decrease weekly dose to 1.25 mg on Mon and 2.5 mg all other days of the week (7.1% dec)  Encouraged to maintain a consistency of vegetables/salads.   Recheck INR in 2 weeks, 6/6    Referring is Dr. Sharee Cadena  INR (no units)   Date Value   05/23/2019 3.6   05/02/2019 2.9   04/18/2019 3.7   03/28/2019 3.5   09/10/2018 4.1   08/13/2018 3   07/16/2018 3.2   06/18/2018 2.6

## 2019-06-06 ENCOUNTER — ANTI-COAG VISIT (OUTPATIENT)
Dept: PHARMACY | Age: 69
End: 2019-06-06
Payer: MEDICARE

## 2019-06-06 DIAGNOSIS — I26.99 PE (PULMONARY THROMBOEMBOLISM) (HCC): ICD-10-CM

## 2019-06-06 LAB — INTERNATIONAL NORMALIZATION RATIO, POC: 2.7

## 2019-06-06 PROCEDURE — 85610 PROTHROMBIN TIME: CPT

## 2019-06-06 PROCEDURE — 99211 OFF/OP EST MAY X REQ PHY/QHP: CPT

## 2019-06-06 NOTE — PROGRESS NOTES
Mr. Jose Chaves is a 79 y.o. y/o male with history of PE   He presents today for anticoagulation monitoring and adjustment. Pertinent PMH: HTN  Patient Reported Findings:  Yes     No  [x]   []       Patient verifies current dosing regimen as listed- uses the paperwork to check off each time takes warfarin   []   [x]       S/S bleeding/bruising/swelling/SOB  []   [x]       Blood in urine or stool  []   [x]       Procedures scheduled in the future at this time   []   [x]       Missed Dose  []   [x]       Extra Dose  []   [x]       Change in medications  []   [x]       Change in health/diet/appetite- Has green beans and potatoes almost every day/ has returned to eating salads twice a week    []   [x]       Change in alcohol use Alcohol in the evening is normally 2 shots of Crown Apple and 1 beer.   []   [x]       Change in activity  []   [x]       Hospital admission   []   [x]       Emergency department visit  []   [x]       Other complaints  []   [x]       Tobacco use--He smokes 1/2 ppd    Clinical Outcomes:  Yes     No  []   [x]       Major bleeding event  []   [x]       Thromboembolic event    Duration of warfarin Therapy: indefinitely  INR Range:  2.0-3.0    INR is 2.7 today    Continue weekly dose of 1.25 mg on Mon and 2.5 mg all other days of the week  Encouraged to maintain a consistency of vegetables/salads.   Needs refill at next appt   Recheck INR in 3 weeks, 6/27    Referring is Dr. Alondra Butt  INR (no units)   Date Value   06/06/2019 2.7   05/23/2019 3.6   05/02/2019 2.9   04/18/2019 3.7   09/10/2018 4.1   08/13/2018 3   07/16/2018 3.2   06/18/2018 2.6

## 2019-06-27 ENCOUNTER — ANTI-COAG VISIT (OUTPATIENT)
Dept: PHARMACY | Age: 69
End: 2019-06-27
Payer: MEDICARE

## 2019-06-27 DIAGNOSIS — I26.99 PE (PULMONARY THROMBOEMBOLISM) (HCC): ICD-10-CM

## 2019-06-27 LAB — INTERNATIONAL NORMALIZATION RATIO, POC: 3.3

## 2019-06-27 PROCEDURE — 99211 OFF/OP EST MAY X REQ PHY/QHP: CPT

## 2019-06-27 PROCEDURE — 85610 PROTHROMBIN TIME: CPT

## 2019-06-27 NOTE — TELEPHONE ENCOUNTER
Warfarin prescription phoned into [facility] to [address] [facility] under Dr. Recio Hence  Warfarin 2.5 mg tabs  Take 1.25 mg on Mon and 2.5 mg all other days of the week  90 days   2 refills

## 2019-06-27 NOTE — PROGRESS NOTES
Mr. Beatriz Ball is a 79 y.o. y/o male with history of PE   He presents today for anticoagulation monitoring and adjustment. Pertinent PMH: HTN  Patient Reported Findings:  Yes     No  [x]   []       Patient verifies current dosing regimen as listed- uses the paperwork to check off each time takes warfarin   []   [x]       S/S bleeding/bruising/swelling/SOB  []   [x]       Blood in urine or stool  []   [x]       Procedures scheduled in the future at this time   []   [x]       Missed Dose  []   [x]       Extra Dose  []   [x]       Change in medications  []   [x]       Change in health/diet/appetite- Has green beans and potatoes almost every day/ has returned to eating salads twice a week --> states he has diminished salad intake    []   [x]       Change in alcohol use Alcohol in the evening is normally 2 shots of Crown Apple and 1 beer.   []   [x]       Change in activity  []   [x]       Hospital admission   []   [x]       Emergency department visit  []   [x]       Other complaints  []   [x]       Tobacco use--He smokes 1/2 ppd    Clinical Outcomes:  Yes     No  []   [x]       Major bleeding event  []   [x]       Thromboembolic event    Duration of warfarin Therapy: indefinitely  INR Range:  2.0-3.0    INR is 3.3 today    Since plans to resume salad intake, take 1.25 mg tomorrow then continue weekly dose of 1.25 mg on Mon and 2.5 mg all other days of the week  Encouraged to maintain a consistency of vegetables/salads.   Refilled warfarin at op pharmacy  Recheck INR in 4 weeks, 7/25    Referring is Dr. Purdy Both  INR (no units)   Date Value   06/27/2019 3.3   06/06/2019 2.7   05/23/2019 3.6   05/02/2019 2.9   09/10/2018 4.1   08/13/2018 3   07/16/2018 3.2   06/18/2018 2.6

## 2019-07-18 ENCOUNTER — HOSPITAL ENCOUNTER (OUTPATIENT)
Dept: CT IMAGING | Age: 69
Discharge: HOME | End: 2019-07-18
Payer: MEDICARE

## 2019-07-18 DIAGNOSIS — F17.200 SMOKER: ICD-10-CM

## 2019-07-18 DIAGNOSIS — I26.09 PULMONARY EMBOLISM WITH ACUTE COR PULMONALE, UNSPECIFIED CHRONICITY, UNSPECIFIED PULMONARY EMBOLISM TYPE (HCC): ICD-10-CM

## 2019-07-18 DIAGNOSIS — Z87.891 HISTORY OF SMOKING 30 OR MORE PACK YEARS: ICD-10-CM

## 2019-07-18 DIAGNOSIS — J44.9 COPD, SEVERITY TO BE DETERMINED (HCC): ICD-10-CM

## 2019-07-18 DIAGNOSIS — Z72.0 NICOTINE ABUSE: ICD-10-CM

## 2019-07-18 PROCEDURE — G0297 LDCT FOR LUNG CA SCREEN: HCPCS

## 2019-07-25 ENCOUNTER — ANTI-COAG VISIT (OUTPATIENT)
Dept: PHARMACY | Age: 69
End: 2019-07-25
Payer: MEDICARE

## 2019-07-25 DIAGNOSIS — I26.99 PE (PULMONARY THROMBOEMBOLISM) (HCC): ICD-10-CM

## 2019-07-25 LAB — INTERNATIONAL NORMALIZATION RATIO, POC: 4.6

## 2019-07-25 PROCEDURE — 99211 OFF/OP EST MAY X REQ PHY/QHP: CPT

## 2019-07-25 PROCEDURE — 85610 PROTHROMBIN TIME: CPT

## 2019-08-08 ENCOUNTER — ANTI-COAG VISIT (OUTPATIENT)
Dept: PHARMACY | Age: 69
End: 2019-08-08
Payer: MEDICARE

## 2019-08-08 DIAGNOSIS — I26.99 PE (PULMONARY THROMBOEMBOLISM) (HCC): ICD-10-CM

## 2019-08-08 LAB — INTERNATIONAL NORMALIZATION RATIO, POC: 3

## 2019-08-08 PROCEDURE — 99211 OFF/OP EST MAY X REQ PHY/QHP: CPT

## 2019-08-08 PROCEDURE — 85610 PROTHROMBIN TIME: CPT

## 2019-08-08 NOTE — PROGRESS NOTES
Mr. Elizabeth Toscano is a 79 y.o. y/o male with history of PE   He presents today for anticoagulation monitoring and adjustment. Pertinent PMH: HTN  Patient Reported Findings:  Yes     No  [x]   []       Patient verifies current dosing regimen as listed- uses the paperwork to check off each time takes warfarin   []   [x]       S/S bleeding/bruising/swelling/SOB  []   [x]       Blood in urine or stool  []   [x]       Procedures scheduled in the future at this time   []   [x]       Missed Dose  []   [x]       Extra Dose  []   [x]       Change in medications  []   [x]       Change in health/diet/appetite- Has green beans and potatoes almost every day/ has returned to eating salads twice a week   [x]   []       Change in alcohol use Alcohol in the evening is normally 2 shots of Crown Apple and 1 beer. --> states that he has been drinking a little less than normal    []   [x]       Change in activity  []   [x]       Hospital admission   []   [x]       Emergency department visit  []   [x]       Other complaints  []   [x]       Tobacco use--He smokes 1/2 ppd    Clinical Outcomes:  Yes     No  []   [x]       Major bleeding event  []   [x]       Thromboembolic event    Duration of warfarin Therapy: indefinitely  INR Range:  2.0-3.0    INR is 3 today    Continue weekly dose of 1.25 mg on Mon, Wed and Fri and 2.5 mg all other days of the week  Encouraged to maintain a consistency of vegetables/salads.   Recheck INR in 3 weeks, 8/29    Referring is Dr. Joyce Gray  INR (no units)   Date Value   08/08/2019 3   07/25/2019 4.6   06/27/2019 3.3   06/06/2019 2.7   09/10/2018 4.1   08/13/2018 3   07/16/2018 3.2   06/18/2018 2.6

## 2019-08-29 ENCOUNTER — ANTI-COAG VISIT (OUTPATIENT)
Dept: PHARMACY | Age: 69
End: 2019-08-29
Payer: MEDICARE

## 2019-08-29 DIAGNOSIS — I26.99 PE (PULMONARY THROMBOEMBOLISM) (HCC): ICD-10-CM

## 2019-08-29 LAB — INTERNATIONAL NORMALIZATION RATIO, POC: 3.1

## 2019-08-29 PROCEDURE — 99211 OFF/OP EST MAY X REQ PHY/QHP: CPT

## 2019-08-29 PROCEDURE — 85610 PROTHROMBIN TIME: CPT

## 2019-09-19 ENCOUNTER — ANTI-COAG VISIT (OUTPATIENT)
Dept: PHARMACY | Age: 69
End: 2019-09-19
Payer: MEDICARE

## 2019-09-19 DIAGNOSIS — I26.99 PE (PULMONARY THROMBOEMBOLISM) (HCC): ICD-10-CM

## 2019-09-19 LAB — INTERNATIONAL NORMALIZATION RATIO, POC: 2.3

## 2019-09-19 PROCEDURE — 85610 PROTHROMBIN TIME: CPT

## 2019-09-19 PROCEDURE — 99211 OFF/OP EST MAY X REQ PHY/QHP: CPT

## 2019-10-17 ENCOUNTER — ANTI-COAG VISIT (OUTPATIENT)
Dept: PHARMACY | Age: 69
End: 2019-10-17
Payer: MEDICARE

## 2019-10-17 DIAGNOSIS — I26.99 PE (PULMONARY THROMBOEMBOLISM) (HCC): ICD-10-CM

## 2019-10-17 LAB — INTERNATIONAL NORMALIZATION RATIO, POC: 2.6

## 2019-10-17 PROCEDURE — 99211 OFF/OP EST MAY X REQ PHY/QHP: CPT

## 2019-10-17 PROCEDURE — 85610 PROTHROMBIN TIME: CPT

## 2019-11-14 ENCOUNTER — ANTI-COAG VISIT (OUTPATIENT)
Dept: PHARMACY | Age: 69
End: 2019-11-14
Payer: MEDICARE

## 2019-11-14 DIAGNOSIS — I26.99 PE (PULMONARY THROMBOEMBOLISM) (HCC): ICD-10-CM

## 2019-11-14 LAB — INTERNATIONAL NORMALIZATION RATIO, POC: 3.4

## 2019-11-14 PROCEDURE — 99211 OFF/OP EST MAY X REQ PHY/QHP: CPT

## 2019-11-14 PROCEDURE — 85610 PROTHROMBIN TIME: CPT

## 2019-12-12 ENCOUNTER — ANTI-COAG VISIT (OUTPATIENT)
Dept: PHARMACY | Age: 69
End: 2019-12-12
Payer: MEDICARE

## 2019-12-12 DIAGNOSIS — I26.99 PE (PULMONARY THROMBOEMBOLISM) (HCC): ICD-10-CM

## 2019-12-12 LAB — INTERNATIONAL NORMALIZATION RATIO, POC: 3.5

## 2019-12-12 PROCEDURE — 85610 PROTHROMBIN TIME: CPT

## 2019-12-12 PROCEDURE — 99211 OFF/OP EST MAY X REQ PHY/QHP: CPT

## 2019-12-31 ENCOUNTER — ANTI-COAG VISIT (OUTPATIENT)
Dept: PHARMACY | Age: 69
End: 2019-12-31
Payer: MEDICARE

## 2019-12-31 DIAGNOSIS — I26.99 PE (PULMONARY THROMBOEMBOLISM) (HCC): ICD-10-CM

## 2019-12-31 LAB — INTERNATIONAL NORMALIZATION RATIO, POC: 2.7

## 2019-12-31 PROCEDURE — 85610 PROTHROMBIN TIME: CPT

## 2019-12-31 PROCEDURE — 99211 OFF/OP EST MAY X REQ PHY/QHP: CPT

## 2020-01-28 ENCOUNTER — ANTI-COAG VISIT (OUTPATIENT)
Dept: PHARMACY | Age: 70
End: 2020-01-28
Payer: MEDICARE

## 2020-01-28 LAB — INTERNATIONAL NORMALIZATION RATIO, POC: 3.1

## 2020-01-28 PROCEDURE — 99211 OFF/OP EST MAY X REQ PHY/QHP: CPT

## 2020-01-28 PROCEDURE — 85610 PROTHROMBIN TIME: CPT

## 2020-02-25 ENCOUNTER — ANTI-COAG VISIT (OUTPATIENT)
Dept: PHARMACY | Age: 70
End: 2020-02-25
Payer: MEDICARE

## 2020-02-25 LAB — INTERNATIONAL NORMALIZATION RATIO, POC: 2.3

## 2020-02-25 PROCEDURE — 99211 OFF/OP EST MAY X REQ PHY/QHP: CPT

## 2020-02-25 PROCEDURE — 85610 PROTHROMBIN TIME: CPT

## 2020-03-20 ENCOUNTER — TELEPHONE (OUTPATIENT)
Dept: PHARMACY | Age: 70
End: 2020-03-20

## 2020-03-20 NOTE — TELEPHONE ENCOUNTER
Patient agreed to new appt date/time. 4/21 @ 2:15p. He may need a refill but, will count his pills and call if he doesn't have enough warfarin to last until 4/21.

## 2020-04-14 ENCOUNTER — TELEPHONE (OUTPATIENT)
Dept: PHARMACY | Age: 70
End: 2020-04-14

## 2020-06-02 NOTE — TELEPHONE ENCOUNTER
Called patient about rescheduling his appt. His last INR was in February. He agreed to come in on Fri. 6/12. Patient needs warfarin refill. Pool message placed. Travel screening done. Patient briefed on new check-in procedures for clinic appts.

## 2020-06-12 ENCOUNTER — ANTI-COAG VISIT (OUTPATIENT)
Dept: PHARMACY | Age: 70
End: 2020-06-12
Payer: MEDICARE

## 2020-06-12 LAB — INTERNATIONAL NORMALIZATION RATIO, POC: 4.5

## 2020-06-12 PROCEDURE — 85610 PROTHROMBIN TIME: CPT

## 2020-06-12 PROCEDURE — 99212 OFFICE O/P EST SF 10 MIN: CPT

## 2020-06-26 ENCOUNTER — ANTI-COAG VISIT (OUTPATIENT)
Dept: PHARMACY | Age: 70
End: 2020-06-26
Payer: MEDICARE

## 2020-06-26 VITALS — TEMPERATURE: 98.1 F

## 2020-06-26 LAB — INTERNATIONAL NORMALIZATION RATIO, POC: 2.8

## 2020-06-26 PROCEDURE — 85610 PROTHROMBIN TIME: CPT

## 2020-06-26 PROCEDURE — 99211 OFF/OP EST MAY X REQ PHY/QHP: CPT

## 2020-06-26 NOTE — PROGRESS NOTES
Mr. Johnson Villa is a 81 y.o. y/o male with history of PE   He presents today for anticoagulation monitoring and adjustment. Pertinent PMH: HTN  Patient Reported Findings:  Yes     No  [x]   []       Patient verifies current dosing regimen as listed- uses the paperwork to check off each time takes warfarin, brings paperwork to appt --> presents with old paperwork and checked off doses   []   [x]       S/S bleeding/bruising/swelling/SOB  []   [x]       Blood in urine or stool  []   [x]       Procedures scheduled in the future at this time   []   [x]       Missed Dose  []   [x]       Extra Dose  []   [x]       Change in medications  []   [x]       Change in health/diet/appetite- Has green beans and potatoes almost every day/ has returned to eating salads twice a week --> states that he has not had salads recently --> states that appetite is diminished     []   [x]       Change in alcohol use Alcohol in the evening is normally 2 shots of Crown Apple and 1 beer --> drinking less   []   [x]       Change in activity  []   [x]       Hospital admission   []   [x]       Emergency department visit  []   [x]       Other complaints  [x]   []       Tobacco use--He smokes 1/2 ppd --> states that he has cut down to 1/4 ppd      Clinical Outcomes:  Yes     No  []   [x]       Major bleeding event  []   [x]       Thromboembolic event    Duration of warfarin Therapy: indefinitely  INR Range:  2.0-3.0    INR is 2.8 today    Continue weekly dose of 2 mg on Mon, Wed and Fri and 1 mg all other days of the week   Encouraged to maintain a consistency of vegetables/salads.   Refilled 1 mg tablets to last 1 month   Recheck INR in 4 weeks, 7/24    Referring is Dr. Chema Salcido  INR (no units)   Date Value   06/26/2020 2.8   06/12/2020 4.5   02/25/2020 2.3   01/28/2020 3.1   09/10/2018 4.1   08/13/2018 3   07/16/2018 3.2   06/18/2018 2.6     CLINICAL PHARMACY CONSULT: MED RECONCILIATION/REVIEW ADDENDUM    For Pharmacy Admin Tracking Only    PHSO:

## 2020-07-24 ENCOUNTER — ANTI-COAG VISIT (OUTPATIENT)
Dept: PHARMACY | Age: 70
End: 2020-07-24
Payer: MEDICARE

## 2020-07-24 VITALS — TEMPERATURE: 97.8 F

## 2020-07-24 LAB — INTERNATIONAL NORMALIZATION RATIO, POC: 3.7

## 2020-07-24 PROCEDURE — 85610 PROTHROMBIN TIME: CPT

## 2020-07-24 PROCEDURE — 99211 OFF/OP EST MAY X REQ PHY/QHP: CPT

## 2020-07-24 NOTE — PROGRESS NOTES
Mr. Sari Sears is a 81 y.o. y/o male with history of PE   He presents today for anticoagulation monitoring and adjustment. Pertinent PMH: HTN  Patient Reported Findings:  Yes     No  [x]   []       Patient verifies current dosing regimen as listed- uses the paperwork to check off each time takes warfarin, brings paperwork to appt --> presents with old paperwork and checked off doses   []   [x]       S/S bleeding/bruising/swelling/SOB  []   [x]       Blood in urine or stool  []   [x]       Procedures scheduled in the future at this time    []   [x]       Missed Dose  []   [x]       Extra Dose  []   [x]       Change in medications  []   [x]       Change in health/diet/appetite- Has green beans and potatoes almost every day/ has returned to eating salads twice a week --> states that he has not had salads recently --> states that appetite is diminished --> eats a salad roughly once a week   []   [x]       Change in alcohol use Alcohol in the evening is normally 2 shots of Crown Apple and 1 beer --> drinking less --> drinks 1 beer and a shot of Crown Apple mixed with coke   []   [x]       Change in activity  []   [x]       Hospital admission   []   [x]       Emergency department visit  []   [x]       Other complaints  [x]   []       Tobacco use--He smokes 1/2 ppd --> states that he has cut down to 1/4 ppd      Clinical Outcomes:  Yes     No  []   [x]       Major bleeding event  []   [x]       Thromboembolic event    Duration of warfarin Therapy: indefinitely  INR Range:  2.0-3.0    INR is 3.7 today    Hold dose for today, then decrease weekly dose to 2 mg on Mon and Fri and 1 mg all other days of the week (10% dec)  Encouraged to maintain a consistency of vegetables/salads. Recheck INR in 2 weeks, 8/10 per pt request unable to do 8/7.     Referring is Dr. Srinath Galarza  INR (no units)   Date Value   06/26/2020 2.8   06/12/2020 4.5   02/25/2020 2.3   01/28/2020 3.1   09/10/2018 4.1   08/13/2018 3   07/16/2018 3.2 06/18/2018 2.6     CLINICAL PHARMACY CONSULT: MED RECONCILIATION/REVIEW ADDENDUM    For Pharmacy Admin Tracking Only    PHSO: No  Total # of Interventions Recommended: 1  - Decreased Dose #: 1  - Maintenance Safety Lab Monitoring #: 1  Total Interventions Accepted: 1  Time Spent (min): [address], PharmD

## 2020-07-29 ENCOUNTER — HOSPITAL ENCOUNTER (OUTPATIENT)
Dept: CT IMAGING | Age: 70
Discharge: HOME | End: 2020-07-29
Payer: MEDICARE

## 2020-07-29 PROCEDURE — G0297 LDCT FOR LUNG CA SCREEN: HCPCS

## 2020-08-04 ENCOUNTER — TELEPHONE (OUTPATIENT)
Dept: CASE MANAGEMENT | Age: 70
End: 2020-08-04

## 2020-08-04 NOTE — TELEPHONE ENCOUNTER
Chart reviewed including smoking history and recent lung scan results. Will send smoking cessation information and resources.  Will follow in the [facility].

## 2020-08-10 ENCOUNTER — ANTI-COAG VISIT (OUTPATIENT)
Dept: PHARMACY | Age: 70
End: 2020-08-10
Payer: MEDICARE

## 2020-08-10 VITALS — TEMPERATURE: 97.3 F

## 2020-08-10 LAB — INTERNATIONAL NORMALIZATION RATIO, POC: 2

## 2020-08-10 PROCEDURE — 99211 OFF/OP EST MAY X REQ PHY/QHP: CPT

## 2020-08-10 PROCEDURE — 85610 PROTHROMBIN TIME: CPT

## 2020-08-10 NOTE — PROGRESS NOTES
Mr. Angelique Johnston is a 81 y.o. y/o male with history of PE   He presents today for anticoagulation monitoring and adjustment. Pertinent PMH: HTN  Patient Reported Findings:  Yes     No  [x]   []       Patient verifies current dosing regimen as listed- uses the paperwork to check off each time takes warfarin, brings paperwork to appt --> presents with old paperwork and checked off doses   []   [x]       S/S bleeding/bruising/swelling/SOB  []   [x]       Blood in urine or stool  []   [x]       Procedures scheduled in the future at this time    []   [x]       Missed Dose  []   [x]       Extra Dose  []   [x]       Change in medications  []   [x]       Change in health/diet/appetite- Has green beans and potatoes almost every day/ has returned to eating salads twice a week --> states that he has not had salads recently --> states that appetite is diminished --> eats a salad roughly once a week   []   [x]       Change in alcohol use Alcohol in the evening is normally 2 shots of Crown Apple and 1 beer --> drinking less --> drinks 1 beer and a shot of Crown Apple mixed with coke   []   [x]       Change in activity  []   [x]       Hospital admission   []   [x]       Emergency department visit  []   [x]       Other complaints  [x]   []       Tobacco use--He smokes 1/2 ppd --> states that he has cut down to 1/4 ppd      Clinical Outcomes:  Yes     No  []   [x]       Major bleeding event  []   [x]       Thromboembolic event    Duration of warfarin Therapy: indefinitely  INR Range:  2.0-3.0    INR is 2.0 today    Continue weekly dose of 2 mg on Mon and Fri and 1 mg all other days of the week  Called in 30 day refill to OP Pharmacy  Encouraged to maintain a consistency of vegetables/salads. Recheck INR in 3 weeks, 8/31.     Referring is Dr. Eduardo Haas  INR (no units)   Date Value   08/10/2020 2.0   07/24/2020 3.7   06/26/2020 2.8   06/12/2020 4.5   09/10/2018 4.1   08/13/2018 3   07/16/2018 3.2   06/18/2018 2.6   CLINICAL PHARMACY CONSULT: MED RECONCILIATION/REVIEW ADDENDUM    For Pharmacy Admin Tracking Only    PHSO: Yes  Total # of Interventions Recommended: 1  - Refills Provided #: 1  - Maintenance Safety Lab Monitoring #: 1  Total Interventions Accepted: 1  Time Spent (min): Mary Jane Ann, PharmD

## 2020-08-31 ENCOUNTER — ANTI-COAG VISIT (OUTPATIENT)
Dept: PHARMACY | Age: 70
End: 2020-08-31
Payer: MEDICARE

## 2020-08-31 VITALS — TEMPERATURE: 97.5 F

## 2020-08-31 LAB — INTERNATIONAL NORMALIZATION RATIO, POC: 1.9

## 2020-08-31 PROCEDURE — 99211 OFF/OP EST MAY X REQ PHY/QHP: CPT

## 2020-08-31 PROCEDURE — 85610 PROTHROMBIN TIME: CPT

## 2020-08-31 NOTE — TELEPHONE ENCOUNTER
Warfarin prescription phoned into [facility] to [address] [facility] under Dr. Cait Marti  Warfarin 1 mg tabs  Take 2 mg on Mon and Fri and 1 mg all other days of the week  90 days   2 refills

## 2020-08-31 NOTE — PROGRESS NOTES
Mr. Nikky Montana is a 81 y.o. y/o male with history of PE   He presents today for anticoagulation monitoring and adjustment. Pertinent PMH: HTN  Patient Reported Findings:  Yes     No  [x]   []       Patient verifies current dosing regimen as listed- uses the paperwork to check off each time takes warfarin, brings paperwork to appt --> presents with old paperwork and checked off doses   []   [x]       S/S bleeding/bruising/swelling/SOB  []   [x]       Blood in urine or stool  []   [x]       Procedures scheduled in the future at this time    []   [x]       Missed Dose  []   [x]       Extra Dose  []   [x]       Change in medications  [x]   []       Change in health/diet/appetite- Has green beans and potatoes almost every day/ has returned to eating salads twice a week --> states that he has not had salads recently --> states that appetite is diminished --> eats a salad roughly once a week --> increased salad intake to almost daily   []   [x]       Change in alcohol use Alcohol in the evening is normally 2 shots of Crown Apple and 1 beer --> drinking less --> drinks 1 beer and a shot of Crown Apple mixed with coke   []   [x]       Change in activity  []   [x]       Hospital admission   []   [x]       Emergency department visit  []   [x]       Other complaints  [x]   []       Tobacco use--He smokes 1/2 ppd --> states that he has cut down to 1/4 ppd      Clinical Outcomes:  Yes     No  []   [x]       Major bleeding event  []   [x]       Thromboembolic event    Duration of warfarin Therapy: indefinitely  INR Range:  2.0-3.0    INR is 1.9 today    Continue weekly dose of 2 mg on Mon and Fri and 1 mg all other days of the week  Refilled warfarin at op pharmacy  Encouraged to maintain a consistency of vegetables/salads. Recheck INR in 3 weeks, 9/21.     Referring is Dr. Rodríguez Means  INR (no units)   Date Value   08/31/2020 1.9   08/10/2020 2.0   07/24/2020 3.7   06/26/2020 2.8   09/10/2018 4.1   08/13/2018 3   07/16/2018 3.2

## 2020-09-21 ENCOUNTER — ANTI-COAG VISIT (OUTPATIENT)
Dept: PHARMACY | Age: 70
End: 2020-09-21
Payer: MEDICARE

## 2020-09-21 VITALS — TEMPERATURE: 97.1 F

## 2020-09-21 LAB — INTERNATIONAL NORMALIZATION RATIO, POC: 2.1

## 2020-09-21 PROCEDURE — 85610 PROTHROMBIN TIME: CPT

## 2020-09-21 PROCEDURE — 99211 OFF/OP EST MAY X REQ PHY/QHP: CPT

## 2020-09-21 NOTE — PROGRESS NOTES
CLINICAL PHARMACY CONSULT: MED RECONCILIATION/REVIEW ADDENDUM    For Pharmacy Admin Tracking Only    PHSO: Yes  Total # of Interventions Recommended: 0  - Maintenance Safety Lab Monitoring #: 1  Total Interventions Accepted: 0  Time Spent (min): 15    Venancio MyersD

## 2020-10-19 ENCOUNTER — ANTI-COAG VISIT (OUTPATIENT)
Dept: PHARMACY | Age: 70
End: 2020-10-19
Payer: MEDICARE

## 2020-10-19 VITALS — TEMPERATURE: 97.5 F

## 2020-10-19 LAB — INTERNATIONAL NORMALIZATION RATIO, POC: 2.2

## 2020-10-19 PROCEDURE — 99211 OFF/OP EST MAY X REQ PHY/QHP: CPT

## 2020-10-19 PROCEDURE — 85610 PROTHROMBIN TIME: CPT

## 2020-10-19 NOTE — PROGRESS NOTES
Mr. Fiona Darling is a 81 y.o. y/o male with history of PE   He presents today for anticoagulation monitoring and adjustment. Pertinent PMH: HTN  Patient Reported Findings:  Yes     No  [x]   []       Patient verifies current dosing regimen as listed- uses the paperwork to check off each time takes warfarin, brings paperwork to appt --> presents with old paperwork and checked off doses   []   [x]       S/S bleeding/bruising/swelling/SOB  []   [x]       Blood in urine or stool  []   [x]       Procedures scheduled in the future at this time    []   [x]       Missed Dose  []   [x]       Extra Dose  []   [x]       Change in medications  [x]   []       Change in health/diet/appetite- Has green beans and potatoes almost every day/ has returned to eating salads twice a week --> states that he has not had salads recently --> states that appetite is diminished --> eats a salad roughly once a week --> increased salad intake to almost daily --> continues    []   [x]       Change in alcohol use Alcohol in the evening is normally 2 shots of Crown Apple and 1 beer --> drinking less --> drinks 1 beer and a shot of Crown Apple mixed with coke   []   [x]       Change in activity  []   [x]       Hospital admission   []   [x]       Emergency department visit  []   [x]       Other complaints  [x]   []       Tobacco use--He smokes 1/2 ppd --> states that he has cut down to 1/4 ppd      Clinical Outcomes:  Yes     No  []   [x]       Major bleeding event  []   [x]       Thromboembolic event    Duration of warfarin Therapy: indefinitely  INR Range:  2.0-3.0    INR is 2.2 today    Continue weekly dose of 2 mg on Mon and Fri and 1 mg all other days of the week  Will need refill at next visit  Encouraged to maintain a consistency of vegetables/salads. Recheck INR in 4 weeks, 10/19.     Referring is Dr. Dee Dee Ibarra  INR (no units)   Date Value   10/19/2020 2.2   09/21/2020 2.1   08/31/2020 1.9   08/10/2020 2.0   09/10/2018 4.1   08/13/2018 3 07/16/2018 3.2   06/18/2018 2.6   CLINICAL PHARMACY CONSULT: MED RECONCILIATION/REVIEW ADDENDUM    For Pharmacy Admin Tracking Only    PHSO: Yes  Total # of Interventions Recommended: 0  - Maintenance Safety Lab Monitoring #: 1  Total Interventions Accepted: 0  Time Spent (min): Mary Jane Ann, PharmD

## 2020-11-03 PROBLEM — J44.9 COPD (CHRONIC OBSTRUCTIVE PULMONARY DISEASE) (HCC): Status: RESOLVED | Noted: 2017-12-27 | Resolved: 2020-11-03

## 2020-11-17 ENCOUNTER — ANTI-COAG VISIT (OUTPATIENT)
Dept: PHARMACY | Age: 70
End: 2020-11-17
Payer: MEDICARE

## 2020-11-17 VITALS — TEMPERATURE: 96.9 F

## 2020-11-17 LAB — INTERNATIONAL NORMALIZATION RATIO, POC: 2.2

## 2020-11-17 PROCEDURE — 85610 PROTHROMBIN TIME: CPT

## 2020-11-17 PROCEDURE — 99211 OFF/OP EST MAY X REQ PHY/QHP: CPT

## 2020-11-17 NOTE — PROGRESS NOTES
Mr. Fiona Darling is a 78 y.o. y/o male with history of PE   He presents today for anticoagulation monitoring and adjustment. Pertinent PMH: HTN  Patient Reported Findings:  Yes     No  [x]   []       Patient verifies current dosing regimen as listed- uses the paperwork to check off each time takes warfarin, brings paperwork to appt --> presents with old paperwork and checked off doses   []   [x]       S/S bleeding/bruising/swelling/SOB  []   [x]       Blood in urine or stool  []   [x]       Procedures scheduled in the future at this time    []   [x]       Missed Dose  []   [x]       Extra Dose  [x]   []       Change in medications wants to start prevagen   []   [x]       Change in health/diet/appetite- Has green beans and potatoes almost every day/ has returned to eating salads twice a week --> states that he has not had salads recently --> states that appetite is diminished --> eats a salad roughly once a week --> increased salad intake to almost daily --> continues    []   [x]       Change in alcohol use Alcohol in the evening is normally 2 shots of Crown Apple and 1 beer --> drinking less --> drinks 1 beer and a shot of Crown Apple mixed with coke   []   [x]       Change in activity  []   [x]       Hospital admission   []   [x]       Emergency department visit  []   [x]       Other complaints  [x]   []       Tobacco use--He smokes 1/2 ppd --> states that he has cut down to 1/4 ppd      Clinical Outcomes:  Yes     No  []   [x]       Major bleeding event  []   [x]       Thromboembolic event    Duration of warfarin Therapy: indefinitely  INR Range:  2.0-3.0    INR is 2.2 today    Continue weekly dose of 2 mg on Mon and Fri and 1 mg all other days of the week  Refilled warfarin at op pharmacy  Encouraged to maintain a consistency of vegetables/salads. Recheck INR in 6 weeks, 12/29.     Referring is Dr. Dee Dee Ibarra  INR (no units)   Date Value   11/17/2020 2.2   10/19/2020 2.2   09/21/2020 2.1   08/31/2020 1.9

## 2020-12-29 ENCOUNTER — ANTI-COAG VISIT (OUTPATIENT)
Dept: PHARMACY | Age: 70
End: 2020-12-29
Payer: MEDICARE

## 2020-12-29 VITALS — TEMPERATURE: 97.1 F

## 2020-12-29 LAB — INTERNATIONAL NORMALIZATION RATIO, POC: 2.4

## 2020-12-29 PROCEDURE — 99211 OFF/OP EST MAY X REQ PHY/QHP: CPT

## 2020-12-29 PROCEDURE — 85610 PROTHROMBIN TIME: CPT

## 2020-12-29 NOTE — PROGRESS NOTES
Mr. Colton Monreal is a 78 y.o. y/o male with history of PE   He presents today for anticoagulation monitoring and adjustment. Pertinent PMH: HTN  Patient Reported Findings:  Yes     No  [x]   []       Patient verifies current dosing regimen as listed- uses the paperwork to check off each time takes warfarin, brings paperwork to appt --> presents with old paperwork and checked off doses   []   [x]       S/S bleeding/bruising/swelling/SOB  []   [x]       Blood in urine or stool  []   [x]       Procedures scheduled in the future at this time    []   [x]       Missed Dose  []   [x]       Extra Dose  [x]   []       Change in medications wants to start prevagen --> started prevagen   []   [x]       Change in health/diet/appetite- Has green beans and potatoes almost every day/ has returned to eating salads twice a week --> states that he has not had salads recently --> states that appetite is diminished --> eats a salad roughly once a week --> increased salad intake to almost daily --> continues    []   [x]       Change in alcohol use Alcohol in the evening is normally 2 shots of Crown Apple and 1 beer --> drinking less --> drinks 1 beer and a shot of Crown Apple mixed with coke   []   [x]       Change in activity  []   [x]       Hospital admission   []   [x]       Emergency department visit  []   [x]       Other complaints  [x]   []       Tobacco use--He smokes 1/2 ppd --> states that he has cut down to 1/4 ppd      Clinical Outcomes:  Yes     No  []   [x]       Major bleeding event  []   [x]       Thromboembolic event    Duration of warfarin Therapy: indefinitely  INR Range:  2.0-3.0    INR is 2.4 today    Continue weekly dose of 2 mg on Mon and Fri and 1 mg all other days of the week  Encouraged to maintain a consistency of vegetables/salads. Recheck INR in 6 weeks, 2/9/21.     Referring is Dr. Christy Loera  INR (no units)   Date Value   11/17/2020 2.2   10/19/2020 2.2   09/21/2020 2.1   08/31/2020 1.9   09/10/2018 4.1 08/13/2018 3   07/16/2018 3.2   06/18/2018 2.6     CLINICAL PHARMACY CONSULT: MED RECONCILIATION/REVIEW ADDENDUM    For Pharmacy Admin Tracking Only    PHSO: No  Total # of Interventions Recommended: 0  - Maintenance Safety Lab Monitoring #: 1  Total Interventions Accepted: 0  Time Spent (min): 15    Aliza Sheppard, VenancioD

## 2021-02-09 ENCOUNTER — ANTI-COAG VISIT (OUTPATIENT)
Dept: PHARMACY | Age: 71
End: 2021-02-09
Payer: MEDICARE

## 2021-02-09 VITALS — TEMPERATURE: 97.1 F

## 2021-02-09 DIAGNOSIS — I26.99 PE (PULMONARY THROMBOEMBOLISM) (HCC): ICD-10-CM

## 2021-02-09 LAB — INTERNATIONAL NORMALIZATION RATIO, POC: 1.9

## 2021-02-09 PROCEDURE — 99211 OFF/OP EST MAY X REQ PHY/QHP: CPT

## 2021-02-09 PROCEDURE — 85610 PROTHROMBIN TIME: CPT

## 2021-02-09 NOTE — PROGRESS NOTES
Mr. Ora Tariq is a 78 y.o. y/o male with history of PE   He presents today for anticoagulation monitoring and adjustment. Pertinent PMH: HTN  Patient Reported Findings:  Yes     No  [x]   []       Patient verifies current dosing regimen as listed- uses the paperwork to check off each time takes warfarin, brings paperwork to appt --> presents with old paperwork and checked off doses   []   [x]       S/S bleeding/bruising/swelling/SOB  []   [x]       Blood in urine or stool  []   [x]       Procedures scheduled in the future at this time    []   [x]       Missed Dose  []   [x]       Extra Dose  []   [x]       Change in medications wants to start prevagen --> started prevagen --> no changes   []   [x]       Change in health/diet/appetite- Has green beans and potatoes almost every day/ has returned to eating salads twice a week --> states that he has not had salads recently --> states that appetite is diminished --> eats a salad roughly once a week --> increased salad intake to almost daily --> continues    []   [x]       Change in alcohol use Alcohol in the evening is normally 2 shots of Crown Apple and 1 beer --> drinking less --> drinks 1 beer and a shot of Crown Apple mixed with coke   []   [x]       Change in activity  []   [x]       Hospital admission   []   [x]       Emergency department visit  []   [x]       Other complaints  [x]   []       Tobacco use--He smokes 1/2 ppd --> states that he has cut down to 1/4 ppd      Clinical Outcomes:  Yes     No  []   [x]       Major bleeding event  []   [x]       Thromboembolic event    Duration of warfarin Therapy: indefinitely  INR Range:  2.0-3.0    INR is 1.9 today    Continue weekly dose of 2 mg on Mon and Fri and 1 mg all other days of the week  Encouraged to maintain a consistency of vegetables/salads. Refilled warfarin at op pharmacy   Recheck INR in 6 weeks, 3/23.     Referring is Dr. Shirin Morris  INR (no units)   Date Value   12/29/2020 2.4   11/17/2020 2.2

## 2021-03-23 ENCOUNTER — ANTI-COAG VISIT (OUTPATIENT)
Dept: PHARMACY | Age: 71
End: 2021-03-23
Payer: MEDICARE

## 2021-03-23 DIAGNOSIS — I26.99 PE (PULMONARY THROMBOEMBOLISM) (HCC): ICD-10-CM

## 2021-03-23 LAB — INTERNATIONAL NORMALIZATION RATIO, POC: 1.9

## 2021-03-23 PROCEDURE — 99211 OFF/OP EST MAY X REQ PHY/QHP: CPT

## 2021-03-23 PROCEDURE — 85610 PROTHROMBIN TIME: CPT

## 2021-03-23 RX ORDER — WARFARIN SODIUM 1 MG/1
1 TABLET ORAL DAILY
COMMUNITY

## 2021-03-23 NOTE — PROGRESS NOTES
Mr. Orlando Smith is a 78 y.o. y/o male with history of PE   He presents today for anticoagulation monitoring and adjustment. Pertinent PMH: HTN  Patient Reported Findings:  Yes     No  [x]   []       Patient verifies current dosing regimen as listed- uses the paperwork to check off each time takes warfarin, brings paperwork to appt --> presents with old paperwork and checked off doses   [x]   []       S/S bleeding/bruising/swelling/SOB states that he hit his elbow recently and it bled for long time   []   [x]       Blood in urine or stool  []   [x]       Procedures scheduled in the future at this time    []   [x]       Missed Dose  []   [x]       Extra Dose  []   [x]       Change in medications  started prevagen --> no changes   []   [x]       Change in health/diet/appetite- Has green beans and potatoes almost every day/ has returned to eating salads twice a week --> states that he has not had salads recently --> states that appetite is diminished --> eats a salad roughly once a week --> increased salad intake to almost daily --> continues    []   [x]       Change in alcohol use Alcohol in the evening is normally 2 shots of Crown Apple and 1 beer --> drinking less --> drinks 1 beer and a shot of Crown Apple mixed with coke    []   [x]       Change in activity  []   [x]       Hospital admission   []   [x]       Emergency department visit  []   [x]       Other complaints  [x]   []       Tobacco use--He smokes 1/2 ppd --> states that he has cut down to 1/4 ppd      Clinical Outcomes:  Yes     No  []   [x]       Major bleeding event  []   [x]       Thromboembolic event    Duration of warfarin Therapy: indefinitely  INR Range:  2.0-3.0    INR is 1.9 again today    Continue weekly dose of 2 mg on Mon and Fri and 1 mg all other days of the week  Encouraged to maintain a consistency of vegetables/salads.    Recheck INR in 6 weeks, 5/4    Referring is Dr. Cielo Ramirez  INR (no units)   Date Value   02/09/2021 1.9   12/29/2020 2.4   11/17/2020 2.2   10/19/2020 2.2   09/10/2018 4.1   08/13/2018 3   07/16/2018 3.2   06/18/2018 2.6     CLINICAL PHARMACY CONSULT: MED RECONCILIATION/REVIEW ADDENDUM    For Pharmacy Admin Tracking Only    PHSO: No  Total # of Interventions Recommended: 0  - Maintenance Safety Lab Monitoring #: 1  Total Interventions Accepted: 0  Time Spent (min): 15    Hugo Alcala, PharmD

## 2021-05-04 ENCOUNTER — ANTI-COAG VISIT (OUTPATIENT)
Dept: PHARMACY | Age: 71
End: 2021-05-04
Payer: MEDICARE

## 2021-05-04 DIAGNOSIS — I26.99 PE (PULMONARY THROMBOEMBOLISM) (HCC): ICD-10-CM

## 2021-05-04 LAB — INTERNATIONAL NORMALIZATION RATIO, POC: 1.8

## 2021-05-04 PROCEDURE — 85610 PROTHROMBIN TIME: CPT

## 2021-05-04 PROCEDURE — 99211 OFF/OP EST MAY X REQ PHY/QHP: CPT

## 2021-05-04 NOTE — PROGRESS NOTES
Mr. Everardo Enamorado is a 78 y.o. y/o male with history of PE   He presents today for anticoagulation monitoring and adjustment. Pertinent PMH: HTN  Patient Reported Findings:  Yes     No  [x]   []       Patient verifies current dosing regimen as listed- uses the paperwork to check off each time takes warfarin, brings paperwork to appt --> presents with old paperwork and checked off doses   [x]   []       S/S bleeding/bruising/swelling/SOB states that he hit his elbow recently and it bled for long time   []   [x]       Blood in urine or stool  []   [x]       Procedures scheduled in the future at this time    []   [x]       Missed Dose  []   [x]       Extra Dose  []   [x]       Change in medications  started prevagen --> no changes   []   [x]       Change in health/diet/appetite- Has green beans and potatoes almost every day/ has returned to eating salads twice a week --> states that he has not had salads recently --> states that appetite is diminished --> eats a salad roughly once a week --> increased salad intake to almost daily --> continues    []   [x]       Change in alcohol use Alcohol in the evening is normally 2 shots of Crown Apple and 1 beer --> drinking less --> drinks 1 beer and a shot of Crown Apple mixed with coke    []   [x]       Change in activity  []   [x]       Hospital admission   []   [x]       Emergency department visit  []   [x]       Other complaints  [x]   []       Tobacco use--He smokes 1/2 ppd --> states that he has cut down to 1/4 ppd      Clinical Outcomes:  Yes     No  []   [x]       Major bleeding event  []   [x]       Thromboembolic event    Duration of warfarin Therapy: indefinitely  INR Range:  2.0-3.0    INR is 1.8 today    Increase weekly dose to 2 mg on Mon Wed and Fri and 1 mg all other days of the week (11% inc)  Encouraged to maintain a consistency of vegetables/salads.    Needs refill at op pharmacy next visit   Recheck INR in 3 weeks, 5/25    Referring is Dr. Jose Guzman  INR (no units)   Date Value   03/23/2021 1.9   02/09/2021 1.9   12/29/2020 2.4   11/17/2020 2.2   09/10/2018 4.1   08/13/2018 3   07/16/2018 3.2   06/18/2018 2.6     For Pharmacy Admin Tracking Only     Intervention Detail: Dose Adjustment: 1: reason: Therapy Optimization   Total # of Interventions Recommended: 1   Total # of Interventions Accepted: 1   Time Spent (min): 15

## 2021-05-25 ENCOUNTER — ANTI-COAG VISIT (OUTPATIENT)
Dept: PHARMACY | Age: 71
End: 2021-05-25
Payer: MEDICARE

## 2021-05-25 DIAGNOSIS — I26.99 PE (PULMONARY THROMBOEMBOLISM) (HCC): Primary | ICD-10-CM

## 2021-05-25 LAB — INTERNATIONAL NORMALIZATION RATIO, POC: 1.9

## 2021-05-25 PROCEDURE — 99211 OFF/OP EST MAY X REQ PHY/QHP: CPT

## 2021-05-25 PROCEDURE — 85610 PROTHROMBIN TIME: CPT

## 2021-05-25 NOTE — PROGRESS NOTES
Mr. Ritchie Figueredo is a 78 y.o. y/o male with history of PE   He presents today for anticoagulation monitoring and adjustment. Pertinent PMH: HTN  Patient Reported Findings:  Yes     No  [x]   []       Patient verifies current dosing regimen as listed- uses the paperwork to check off each time takes warfarin, brings paperwork to appt --> presents with old paperwork and checked off doses   [x]   []       S/S bleeding/bruising/swelling/SOB states that he hit his elbow recently and it bled for long time   []   [x]       Blood in urine or stool  []   [x]       Procedures scheduled in the future at this time    []   [x]       Missed Dose  []   [x]       Extra Dose  []   [x]       Change in medications  started prevagen --> no changes   []   [x]       Change in health/diet/appetite- Has green beans and potatoes almost every day/ has returned to eating salads twice a week --> states that he has not had salads recently --> states that appetite is diminished --> eats a salad roughly once a week --> increased salad intake to almost daily --> continues    []   [x]       Change in alcohol use Alcohol in the evening is normally 2 shots of Crown Apple and 1 beer --> drinking less --> drinks 1 beer and a shot of Crown Apple mixed with coke    []   [x]       Change in activity  []   [x]       Hospital admission   []   [x]       Emergency department visit  []   [x]       Other complaints  [x]   []       Tobacco use--He smokes 1/2 ppd --> states that he has cut down to 1/4 ppd      Clinical Outcomes:  Yes     No  []   [x]       Major bleeding event  []   [x]       Thromboembolic event    Duration of warfarin Therapy: indefinitely  INR Range:  2.0-3.0    INR is 1.9 today    Increase weekly dose to 1 mg Sun, Tues and Thurs and 2.5 mg all other days of the week (10% inc)  Encouraged to maintain a consistency of vegetables/salads.    Refilled warfarin at op pharmacy   Recheck INR in 3 weeks, 6/15    Referring is Dr. Parker Trevino  INR (no units)   Date Value   05/04/2021 1.8   03/23/2021 1.9   02/09/2021 1.9   12/29/2020 2.4   09/10/2018 4.1   08/13/2018 3   07/16/2018 3.2   06/18/2018 2.6     For Pharmacy Admin Tracking Only     Intervention Detail: Dose Adjustment: 1: reason: Therapy Optimization and Refill(s) Provided   Total # of Interventions Recommended: 2   Total # of Interventions Accepted: 2   Time Spent (min): 15

## 2021-06-15 ENCOUNTER — ANTI-COAG VISIT (OUTPATIENT)
Dept: PHARMACY | Age: 71
End: 2021-06-15
Payer: MEDICARE

## 2021-06-15 DIAGNOSIS — I26.99 PE (PULMONARY THROMBOEMBOLISM) (HCC): Primary | ICD-10-CM

## 2021-06-15 LAB — INTERNATIONAL NORMALIZATION RATIO, POC: 1.6

## 2021-06-15 PROCEDURE — 85610 PROTHROMBIN TIME: CPT

## 2021-06-15 PROCEDURE — 99212 OFFICE O/P EST SF 10 MIN: CPT

## 2021-06-15 NOTE — PROGRESS NOTES
Mr. Bobby Tapia is a 78 y.o. y/o male with history of PE   He presents today for anticoagulation monitoring and adjustment. Pertinent PMH: HTN  Patient Reported Findings:  Yes     No  [x]   []       Patient verifies current dosing regimen as listed- uses the paperwork to check off each time takes warfarin, brings paperwork to appt --> presents with old paperwork and checked off doses   [x]   []       S/S bleeding/bruising/swelling/SOB states that he hit his elbow recently and it bled for long time.  Has been having lightheadedness/dizziness recently    []   [x]       Blood in urine or stool  [x]   []       Procedures scheduled in the future at this time  Dentist appt fri, thinks might have to have teeth pulled   []   [x]       Missed Dose  []   [x]       Extra Dose  []   [x]       Change in medications  started prevagen --> no changes   [x]   []       Change in health/diet/appetite- Has green beans and potatoes almost every day/ has returned to eating salads twice a week --> states that he has not had salads recently --> states that appetite is diminished --> eats a salad roughly once a week --> increased salad intake to almost daily --> states hasnt been able to eat much d/t broken teeth   []   [x]       Change in alcohol use Alcohol in the evening is normally 2 shots of Crown Apple and 1 beer --> drinking less --> drinks 1 beer and a shot of Crown Apple mixed with coke --> no changes    []   [x]       Change in activity  []   [x]       Hospital admission   []   [x]       Emergency department visit  []   [x]       Other complaints  [x]   []       Tobacco use--He smokes 1/2 ppd --> states that he has cut down to 1/4 ppd      Clinical Outcomes:  Yes     No  []   [x]       Major bleeding event  []   [x]       Thromboembolic event    Duration of warfarin Therapy: indefinitely  INR Range:  2.0-3.0    INR is 1.6 today despite increasing weekly dose last visit    Increase weekly dose to 1 mg Sun and 2 mg all other days of the week (18% inc)  Encouraged to maintain a consistency of vegetables/salads.    Advised for pt to get established with PCP to discuss lightheadedness and wt loss  Recheck INR in 2 weeks, 6/29    Referring is Dr. Cristino Miguel  INR (no units)   Date Value   05/25/2021 1.9   05/04/2021 1.8   03/23/2021 1.9   02/09/2021 1.9   09/10/2018 4.1   08/13/2018 3   07/16/2018 3.2   06/18/2018 2.6     For Pharmacy Admin Tracking Only     Intervention Detail: Dose Adjustment: 1, reason: Therapy Optimization   Total # of Interventions Recommended: 1   Total # of Interventions Accepted: 1   Time Spent (min): 15

## 2021-06-29 ENCOUNTER — ANTI-COAG VISIT (OUTPATIENT)
Dept: PHARMACY | Age: 71
End: 2021-06-29
Payer: MEDICARE

## 2021-06-29 DIAGNOSIS — I26.99 PE (PULMONARY THROMBOEMBOLISM) (HCC): Primary | ICD-10-CM

## 2021-06-29 LAB — INTERNATIONAL NORMALIZATION RATIO, POC: 2.1

## 2021-06-29 PROCEDURE — 85610 PROTHROMBIN TIME: CPT

## 2021-06-29 PROCEDURE — 99211 OFF/OP EST MAY X REQ PHY/QHP: CPT

## 2021-07-20 ENCOUNTER — ANTI-COAG VISIT (OUTPATIENT)
Dept: PHARMACY | Age: 71
End: 2021-07-20
Payer: MEDICARE

## 2021-07-20 DIAGNOSIS — I26.99 PE (PULMONARY THROMBOEMBOLISM) (HCC): Primary | ICD-10-CM

## 2021-07-20 LAB — INTERNATIONAL NORMALIZATION RATIO, POC: 1.6

## 2021-07-20 PROCEDURE — 85610 PROTHROMBIN TIME: CPT

## 2021-07-20 PROCEDURE — 99212 OFFICE O/P EST SF 10 MIN: CPT

## 2021-07-20 NOTE — PROGRESS NOTES
Mr. Quang Kunz is a 78 y.o. y/o male with history of PE   He presents today for anticoagulation monitoring and adjustment. Pertinent PMH: HTN  Patient Reported Findings:  Yes     No  [x]   []       Patient verifies current dosing regimen as listed- uses the paperwork to check off each time takes warfarin, brings paperwork to appt --> presents with old paperwork and checked off doses   []   [x]       S/S bleeding/bruising/swelling/SOB states that he hit his elbow recently and it bled for long time. Has been having lightheadedness/dizziness recently --> has scab on nose that will itch then bleed --> no changes    []   [x]       Blood in urine or stool  [x]   []       Procedures scheduled in the future at this time  Dentist appt fri, thinks might have to have teeth pulled --> states too expensive to pull teeth and get dentures. Is looking for new dentist --> no plan for new teeth    []   [x]       Missed Dose  []   [x]       Extra Dose  []   [x]       Change in medications  started prevagen --> no changes   [x]   []       Change in health/diet/appetite- Has green beans and potatoes almost every day/ has returned to eating salads twice a week --> states that he has not had salads recently --> states that appetite is diminished --> eats a salad roughly once a week --> increased salad intake to almost daily --> states hasnt been able to eat much d/t broken teeth --> no changes, still only eating soft foods --> appetite diminished, eating once a week   []   [x]       Change in alcohol use Alcohol in the evening is normally 2 shots of Crown Apple and 1 beer --> drinking less --> drinks 1 beer and a shot of Crown Apple mixed with coke --> no changes    []   [x]       Change in activity  []   [x]       Hospital admission   []   [x]       Emergency department visit  []   [x]       Other complaints  [x]   []       Tobacco use--He smokes 1/2 ppd --> states that he has cut down to 1/4 ppd      Clinical Outcomes:   Yes No  []   [x]       Major bleeding event  []   [x]       Thromboembolic event    Duration of warfarin Therapy: indefinitely  INR Range:  2.0-3.0    INR is 1.6 today  Take 3 mg tonight then increase weekly dose to 3 mg Sun and 2 mg all other days of the week (15% inc)  Encouraged to maintain a consistency of vegetables/salads.    Advised for pt to get established with PCP to discuss lightheadedness and wt loss  Recheck INR in 2 weeks, 8/3    Referring is Dr. Ruth Mena  INR (no units)   Date Value   06/29/2021 2.1   06/15/2021 1.6   05/25/2021 1.9   05/04/2021 1.8   09/10/2018 4.1   08/13/2018 3   07/16/2018 3.2   06/18/2018 2.6     For Pharmacy Admin Tracking Only     Intervention Detail: Dose Adjustment: 1, reason: Therapy Optimization   Total # of Interventions Recommended: 1   Total # of Interventions Accepted: 1   Time Spent (min): 15

## 2021-08-03 ENCOUNTER — ANTI-COAG VISIT (OUTPATIENT)
Dept: PHARMACY | Age: 71
End: 2021-08-03
Payer: MEDICARE

## 2021-08-03 DIAGNOSIS — I26.99 PE (PULMONARY THROMBOEMBOLISM) (HCC): Primary | ICD-10-CM

## 2021-08-03 LAB — INTERNATIONAL NORMALIZATION RATIO, POC: 2

## 2021-08-03 PROCEDURE — 85610 PROTHROMBIN TIME: CPT

## 2021-08-03 PROCEDURE — 99211 OFF/OP EST MAY X REQ PHY/QHP: CPT

## 2021-08-03 NOTE — PROGRESS NOTES
Mr. Ranjeet Polanco is a 78 y.o. y/o male with history of PE   He presents today for anticoagulation monitoring and adjustment. Pertinent PMH: HTN  Patient Reported Findings:  Yes     No   [x]   []       Patient verifies current dosing regimen as listed- uses the paperwork to check off each time takes warfarin, brings paperwork to appt --> presents with old paperwork and checked off doses   []   [x]       S/S bleeding/bruising/swelling/SOB states that he hit his elbow recently and it bled for long time. Has been having lightheadedness/dizziness recently --> has scab on nose that will itch then bleed --> no changes   []   [x]       Blood in urine or stool  [x]   []       Procedures scheduled in the future at this time  Dentist appt fri, thinks might have to have teeth pulled --> states too expensive to pull teeth and get dentures.  Is looking for new dentist --> no plan for new teeth --> another one pulled recently; new dental appt 8/25  []   [x]       Missed Dose  []   [x]       Extra Dose  []   [x]       Change in medications  started prevagen --> no changes   [x]   []       Change in health/diet/appetite- Has green beans and potatoes almost every day/ has returned to eating salads twice a week --> states that he has not had salads recently --> states that appetite is diminished --> eats a salad roughly once a week --> increased salad intake to almost daily --> states hasnt been able to eat much d/t broken teeth --> no changes, still only eating soft foods --> appetite diminished, eating once a week --> no greens   []   [x]       Change in alcohol use Alcohol in the evening is normally 2 shots of Crown Apple and 1 beer --> drinking less --> drinks 1 beer and a shot of Crown Apple mixed with coke --> no changes    []   [x]       Change in activity  []   [x]       Hospital admission   []   [x]       Emergency department visit  []   [x]       Other complaints  [x]   []       Tobacco use--He smokes 1/2 ppd --> states that he has cut down to 1/4 ppd --> 1/2 PPD     Clinical Outcomes:  Yes     No  []   [x]       Major bleeding event  []   [x]       Thromboembolic event    Duration of warfarin Therapy: indefinitely  INR Range:  2.0-3.0    INR is 2.0 today  Continue weekly dose of 3 mg Sun and 2 mg all other days of the week   Encouraged to maintain a consistency of vegetables/salads.    Advised for pt to get established with PCP to discuss lightheadedness and wt loss  Will need refill at next visit   Recheck INR in 3 weeks, 8/24    Referring is Dr. Artis Sanches  INR (no units)   Date Value   07/20/2021 1.6   06/29/2021 2.1   06/15/2021 1.6   05/25/2021 1.9   09/10/2018 4.1   08/13/2018 3   07/16/2018 3.2   06/18/2018 2.6     For Pharmacy Admin Tracking Only     Total # of Interventions Recommended: 0   Total # of Interventions Accepted: 0   Time Spent (min): 15

## 2021-08-11 ENCOUNTER — APPOINTMENT (OUTPATIENT)
Dept: CT IMAGING | Age: 71
End: 2021-08-11
Payer: MEDICARE

## 2021-08-11 ENCOUNTER — HOSPITAL ENCOUNTER (EMERGENCY)
Age: 71
Discharge: HOME | End: 2021-08-11
Attending: STUDENT IN AN ORGANIZED HEALTH CARE EDUCATION/TRAINING PROGRAM
Payer: MEDICARE

## 2021-08-11 ENCOUNTER — APPOINTMENT (OUTPATIENT)
Dept: GENERAL RADIOLOGY | Age: 71
End: 2021-08-11
Payer: MEDICARE

## 2021-08-11 VITALS
DIASTOLIC BLOOD PRESSURE: 75 MMHG | HEIGHT: 67 IN | RESPIRATION RATE: 13 BRPM | TEMPERATURE: 96.6 F | WEIGHT: 119.06 LBS | BODY MASS INDEX: 18.69 KG/M2 | OXYGEN SATURATION: 100 % | SYSTOLIC BLOOD PRESSURE: 164 MMHG | HEART RATE: 74 BPM

## 2021-08-11 DIAGNOSIS — R53.1 GENERAL WEAKNESS: ICD-10-CM

## 2021-08-11 DIAGNOSIS — R42 DIZZINESS: Primary | ICD-10-CM

## 2021-08-11 LAB
A/G RATIO: 0.8 (ref 1.1–2.2)
ALBUMIN SERPL-MCNC: 3.5 G/DL (ref 3.4–5)
ALP BLD-CCNC: 112 U/L (ref 40–129)
ALT SERPL-CCNC: 16 U/L (ref 10–40)
ANION GAP SERPL CALCULATED.3IONS-SCNC: 12 MMOL/L (ref 3–16)
AST SERPL-CCNC: 52 U/L (ref 15–37)
BASOPHILS ABSOLUTE: 0 K/UL (ref 0–0.2)
BASOPHILS RELATIVE PERCENT: 0.8 %
BILIRUB SERPL-MCNC: 2.5 MG/DL (ref 0–1)
BILIRUBIN URINE: ABNORMAL
BLOOD, URINE: NEGATIVE
BUN BLDV-MCNC: 9 MG/DL (ref 7–20)
CALCIUM SERPL-MCNC: 9 MG/DL (ref 8.3–10.6)
CHLORIDE BLD-SCNC: 98 MMOL/L (ref 99–110)
CLARITY: CLEAR
CO2: 22 MMOL/L (ref 21–32)
COLOR: ABNORMAL
CREAT SERPL-MCNC: 0.8 MG/DL (ref 0.8–1.3)
EOSINOPHILS ABSOLUTE: 0.1 K/UL (ref 0–0.6)
EOSINOPHILS RELATIVE PERCENT: 2.8 %
GFR AFRICAN AMERICAN: >60
GFR NON-AFRICAN AMERICAN: >60
GLOBULIN: 4.5 G/DL
GLUCOSE BLD-MCNC: 100 MG/DL (ref 70–99)
GLUCOSE URINE: NEGATIVE MG/DL
HCT VFR BLD CALC: 43.7 % (ref 40.5–52.5)
HEMOGLOBIN: 14.5 G/DL (ref 13.5–17.5)
KETONES, URINE: 15 MG/DL
LEUKOCYTE ESTERASE, URINE: NEGATIVE
LYMPHOCYTES ABSOLUTE: 0.8 K/UL (ref 1–5.1)
LYMPHOCYTES RELATIVE PERCENT: 20 %
MAGNESIUM: 2.2 MG/DL (ref 1.8–2.4)
MCH RBC QN AUTO: 31.5 PG (ref 26–34)
MCHC RBC AUTO-ENTMCNC: 33.2 G/DL (ref 31–36)
MCV RBC AUTO: 95 FL (ref 80–100)
MICROSCOPIC EXAMINATION: ABNORMAL
MONOCYTES ABSOLUTE: 0.2 K/UL (ref 0–1.3)
MONOCYTES RELATIVE PERCENT: 4.9 %
NEUTROPHILS ABSOLUTE: 2.9 K/UL (ref 1.7–7.7)
NEUTROPHILS RELATIVE PERCENT: 71.5 %
NITRITE, URINE: NEGATIVE
PDW BLD-RTO: 15 % (ref 12.4–15.4)
PH UA: 5.5 (ref 5–8)
PHOSPHORUS: 3.4 MG/DL (ref 2.5–4.9)
PLATELET # BLD: 191 K/UL (ref 135–450)
PMV BLD AUTO: 8.5 FL (ref 5–10.5)
POTASSIUM REFLEX MAGNESIUM: 5.3 MMOL/L (ref 3.5–5.1)
POTASSIUM SERPL-SCNC: 3.9 MMOL/L (ref 3.5–5.1)
PROTEIN UA: NEGATIVE MG/DL
RBC # BLD: 4.61 M/UL (ref 4.2–5.9)
SODIUM BLD-SCNC: 132 MMOL/L (ref 136–145)
SPECIFIC GRAVITY UA: >1.03 (ref 1–1.03)
TOTAL PROTEIN: 8 G/DL (ref 6.4–8.2)
TROPONIN: <0.01 NG/ML
URINE REFLEX TO CULTURE: ABNORMAL
URINE TYPE: ABNORMAL
UROBILINOGEN, URINE: 2 E.U./DL
WBC # BLD: 4.1 K/UL (ref 4–11)

## 2021-08-11 PROCEDURE — 83735 ASSAY OF MAGNESIUM: CPT

## 2021-08-11 PROCEDURE — 80053 COMPREHEN METABOLIC PANEL: CPT

## 2021-08-11 PROCEDURE — 93005 ELECTROCARDIOGRAM TRACING: CPT | Performed by: STUDENT IN AN ORGANIZED HEALTH CARE EDUCATION/TRAINING PROGRAM

## 2021-08-11 PROCEDURE — 96361 HYDRATE IV INFUSION ADD-ON: CPT

## 2021-08-11 PROCEDURE — 84484 ASSAY OF TROPONIN QUANT: CPT

## 2021-08-11 PROCEDURE — 6360000004 HC RX CONTRAST MEDICATION: Performed by: STUDENT IN AN ORGANIZED HEALTH CARE EDUCATION/TRAINING PROGRAM

## 2021-08-11 PROCEDURE — 81003 URINALYSIS AUTO W/O SCOPE: CPT

## 2021-08-11 PROCEDURE — 36415 COLL VENOUS BLD VENIPUNCTURE: CPT

## 2021-08-11 PROCEDURE — 99283 EMERGENCY DEPT VISIT LOW MDM: CPT

## 2021-08-11 PROCEDURE — 84132 ASSAY OF SERUM POTASSIUM: CPT

## 2021-08-11 PROCEDURE — 84100 ASSAY OF PHOSPHORUS: CPT

## 2021-08-11 PROCEDURE — 71046 X-RAY EXAM CHEST 2 VIEWS: CPT

## 2021-08-11 PROCEDURE — 93970 EXTREMITY STUDY: CPT

## 2021-08-11 PROCEDURE — 74177 CT ABD & PELVIS W/CONTRAST: CPT

## 2021-08-11 PROCEDURE — 70450 CT HEAD/BRAIN W/O DYE: CPT

## 2021-08-11 PROCEDURE — 96360 HYDRATION IV INFUSION INIT: CPT

## 2021-08-11 PROCEDURE — 2580000003 HC RX 258: Performed by: STUDENT IN AN ORGANIZED HEALTH CARE EDUCATION/TRAINING PROGRAM

## 2021-08-11 PROCEDURE — 85025 COMPLETE CBC W/AUTO DIFF WBC: CPT

## 2021-08-11 RX ORDER — 0.9 % SODIUM CHLORIDE 0.9 %
1000 INTRAVENOUS SOLUTION INTRAVENOUS ONCE
Status: COMPLETED | OUTPATIENT
Start: 2021-08-11 | End: 2021-08-11

## 2021-08-11 RX ADMIN — SODIUM CHLORIDE 1000 ML: 9 INJECTION, SOLUTION INTRAVENOUS at 13:29

## 2021-08-11 RX ADMIN — IOPAMIDOL 75 ML: 755 INJECTION, SOLUTION INTRAVENOUS at 13:50

## 2021-08-11 ASSESSMENT — PAIN SCALES - GENERAL: PAINLEVEL_OUTOF10: 7

## 2021-08-11 ASSESSMENT — PAIN DESCRIPTION - LOCATION: LOCATION: ANKLE

## 2021-08-11 ASSESSMENT — PAIN DESCRIPTION - ORIENTATION: ORIENTATION: RIGHT;LEFT

## 2021-08-11 NOTE — ED NOTES
Patient discharged to home. Patient verbalized understanding of discharge instructions. Patient alert and oriented x4.      Era Hutchinson RN  08/11/21 8040

## 2021-08-11 NOTE — ED PROVIDER NOTES
Primary Care Physician: Referring Not In System (Inactive)   Attending Physician: Kathleen Kiran MD     History   Chief Complaint   Patient presents with    Dizziness     Pt. states that he has been dizzy, can't eat, and legs are bothering him. Pt. also states he has lost weight for appr. 3 weeks        HPI   Mitra Nieto is a 78 y.o. male history of alcohol abuse, hypertension, pulmonary embolism on anticoagulation, tobacco abuse presenting this morning complaining of dizziness and not been able to eat with some leg pain that has been going on now for 3 weeks. Patient stated that she is also had weight loss. Complain of some dizziness but denies any chest pain, shortness of breath, fevers, chills, nausea vomiting. He denies any abdominal pain. No dysuria no URI symptoms no Covid-like symptoms. He states that he stopped drinking for some time now. Past Medical History:   Diagnosis Date    ETOH abuse     drinks 3-4 beers and 2 shots of liquor per day    HTN (hypertension) 09/09/2017    Started Lisinopril 10 mg daily 9/10/17    Pneumonia involving left lung 09/09/2017    Polycythemia 09/09/2017    Pulmonary embolism, bilateral ([address] 09/09/2017    Tobacco abuse     1 pack per day        Past Surgical History:   Procedure Laterality Date    CHOLECYSTECTOMY, LAPAROSCOPIC  12/28/2017        History reviewed. No pertinent family history. Social History     Socioeconomic History    Marital status:      Spouse name: None    Number of children: None    Years of education: None    Highest education level: None   Occupational History    None   Tobacco Use    Smoking status: Former Smoker     Packs/day: 1.00     Years: 49.00     Pack years: 49.00    Smokeless tobacco: Never Used    Tobacco comment: started to smoke at 15 / smoked up to 0.5 p.p.d / now smoking 0.5 p.p.d    Substance and Sexual Activity    Alcohol use:  Yes     Alcohol/week: 3.0 standard drinks     Types: 1 Cans of beer, 2 Shots of liquor per week     Comment: 2 shots jarred beam and 1 can beer    Drug use: No    Sexual activity: None   Other Topics Concern    None   Social History Narrative    None     Social Determinants of Health     Financial Resource Strain:     Difficulty of Paying Living Expenses:    Food Insecurity:     Worried About Running Out of Food in the Last Year:     Ran Out of Food in the Last Year:    Transportation Needs:     Lack of Transportation (Medical):  Lack of Transportation (Non-Medical):    Physical Activity:     Days of Exercise per Week:     Minutes of Exercise per Session:    Stress:     Feeling of Stress :    Social Connections:     Frequency of Communication with Friends and Family:     Frequency of Social Gatherings with Friends and Family:     Attends Mosque Services:     Active Member of Clubs or Organizations:     Attends Club or Organization Meetings:     Marital Status:    Intimate Partner Violence:     Fear of Current or Ex-Partner:     Emotionally Abused:     Physically Abused:     Sexually Abused:         Review of Systems   10 total systems reviewed and found to be negative unless otherwise noted in HPI     Physical Exam   BP (!) 143/64   Pulse 74   Temp 96.6 °F (35.9 °C) (Tympanic)   Resp 12   Ht 5' 7\" (1.702 m)   Wt 119 lb 0.9 oz (54 kg)   SpO2 100%   BMI 18.65 kg/m²      CONSTITUTIONAL: Looks malnourished, in no acute distress   HEAD: atraumatic, normocephalic   EYES: PERRL, No injection, discharge or scleral icterus. ENT: Moist mucous membranes. NECK: Normal ROM, NO LAD   CARDIOVASCULAR: Regular rate and rhythm. No murmurs or gallop. PULMONARY/CHEST: Airway patent. No retractions. Breath sounds clear with good air entry bilaterally. ABDOMEN: Soft, Non-distended and non-tender, without guarding or rebound. SKIN: Acyanotic, warm, dry   MUSCULOSKELETAL: No swelling, tenderness or deformity   NEUROLOGICAL: Awake and oriented x 3. Pulses intact. Grossly nonfocal   Nursing note and vitals reviewed. ED Course & Medical Decision Making   Medications   0.9 % sodium chloride bolus (1,000 mLs Intravenous New Bag 8/11/21 1329)   iopamidol (ISOVUE-370) 76 % injection 75 mL (75 mLs Intravenous Given 8/11/21 1350)      Labs Reviewed   CBC WITH AUTO DIFFERENTIAL - Abnormal; Notable for the following components:       Result Value    Lymphocytes Absolute 0.8 (*)     All other components within normal limits    Narrative:     Performed at:  [facility] [address], [employer] [place]   Phone [phone]   COMPREHENSIVE METABOLIC PANEL W/ REFLEX TO MG FOR LOW K - Abnormal; Notable for the following components:    Sodium 132 (*)     Potassium reflex Magnesium 5.3 (*)     Chloride 98 (*)     Glucose 100 (*)     Albumin/Globulin Ratio 0.8 (*)     Total Bilirubin 2.5 (*)     AST 52 (*)     All other components within normal limits    Narrative:     Performed at:  [facility] [address], [place]   Phone [phone]   TROPONIN    Narrative:     Performed at:  [facility] [address], [employer]   Phone [phone]   MAGNESIUM    Narrative:     Performed at:  [facility] [address] [employer]   Phone [phone]   PHOSPHORUS    Narrative:     Performed at:  [facility] [address]   Phone [phone]   URINE RT REFLEX TO CULTURE   POTASSIUM   PROTIME-INR   APTT      CT Head WO Contrast   Final Result   No acute intracranial abnormality. XR CHEST (2 VW)   Final Result   No radiographic evidence of acute pulmonary disease. CT ABDOMEN PELVIS W IV CONTRAST Additional Contrast? None    (Results Pending)      No results found.      EKG INTERPRETATION:  EKG by my preliminary interpretation shows sinus rhythm with rate of 77, normal axis, normal intervals, with no ST changes indicative of ischemia at this time. PROCEDURES:   Procedures    ASSESSMENT AND PLAN:  Declan Naranjo is a 78 y.o. male presenting complaining of dizziness and weakness. Stated that he is not been able to eat for past 3 weeks. On exam he looks chronically ill and malnourished. Vitals are so far unremarkable with the temperature of 96.6. Given his presentation I was concerned for electrolyte imbalances and I did obtain labs. So far results are unremarkable apart from potassium of 5.3. EKG with no ischemic changes and normal troponin. CT of the head was obtained showed no abnormal findings, CT of the abdomen pelvic pending. I have repeated troponin which is pending as well. If results are normal I believe patient could be discharged home. His care has been signed over to Dr. [facility] who follow-up and discharge patient up appropriately. ClINICAL IMPRESSION:  1. Dizziness    2. General weakness          DISPOSITION    Pending complete evaluation  Rosendo Boone MD (electronically signed)  8/11/2021  _________________________________________________________________________________________  _________________________________________________________________________________________  This record is transcribed utilizing voice recognition technology. There are inherent limitations in this technology. In addition, there may be limitations in editing of this report. If there are any discrepancies, please contact me directly.         Juan Manuel Roberts MD  08/11/21 5321

## 2021-08-12 LAB
EKG ATRIAL RATE: 77 BPM
EKG DIAGNOSIS: NORMAL
EKG P AXIS: 82 DEGREES
EKG P-R INTERVAL: 126 MS
EKG Q-T INTERVAL: 432 MS
EKG QRS DURATION: 70 MS
EKG QTC CALCULATION (BAZETT): 488 MS
EKG R AXIS: 74 DEGREES
EKG T AXIS: 71 DEGREES
EKG VENTRICULAR RATE: 77 BPM

## 2021-08-12 PROCEDURE — 93010 ELECTROCARDIOGRAM REPORT: CPT | Performed by: INTERNAL MEDICINE

## 2021-08-23 ENCOUNTER — TELEPHONE (OUTPATIENT)
Dept: PHARMACY | Age: 71
End: 2021-08-23

## 2021-08-23 NOTE — TELEPHONE ENCOUNTER
Guillermina returned my call, shes going to try and get information from pts ED visit at [facility] to confirm MD took pt off warfarin.

## 2021-08-23 NOTE — TELEPHONE ENCOUNTER
Pt called stating he was seen in the ED at [facility] , MD took him off of warfarin d/t all the issues he's been having. Advised him to call referring MD Dr Carlos Amor to let him know whats going on. Pt states Dr Carlos Amor only wants to see him every 6 months so hes not going to bother w/ calling him. Explained that we needed confirmation from referring MD to d/c from [address] [place] [address]. Called [facility], she will have Guillermina pts nurse call us back.

## 2021-08-23 NOTE — TELEPHONE ENCOUNTER
Guillermina from [facility] called stating she did confirm that MD from [facility] did stop pts warfarin. She will let Dr Derrick Rutherford know whats going on, incase he needs to look into this further.

## 2021-09-03 NOTE — TELEPHONE ENCOUNTER
Called and spoke with patient. He confirms he remains off warfarin and has not contacted Dr. Araceli Rodríguez regarding this however, he does have a new primary care physician. He cannot remember the name of this doctor but, states this doctor did not restart the warfarin. I inquired about an AVS from his visit, which would have the doctor's name on it. Patient states, it's in the garage and I'm in the house right now. I asked the patient to please call us back, at his convenience with his new PCP information and explained that it was necessary for us to be able to stop calling him.

## 2021-09-10 NOTE — TELEPHONE ENCOUNTER
Call [facility] /Dr Roxane Vazquez office asking if they had the name of the physician who stopped pts warfarin. Guillermina returned my call, LVM stating Dr Vito Turcios oncologist at [facility] who took pt off warfarin when he was admitted there on 8/23/21. States pt will f/u w/ oncologist at [facility] and not Dr Pepe White.

## 2021-09-22 ENCOUNTER — ANTI-COAG VISIT (OUTPATIENT)
Dept: PHARMACY | Age: 71
End: 2021-09-22

## 2021-09-22 PROBLEM — I26.99 PE (PULMONARY THROMBOEMBOLISM) (HCC): Status: RESOLVED | Noted: 2017-09-09 | Resolved: 2021-09-22

## 2021-09-22 NOTE — TELEPHONE ENCOUNTER
[facility] office and spoke with Lon Veloz. She confirms that Dr. Prosper Pelletier took patient off warfarin and they will monitor patient closely to be sure he does not need to restart. Transferring are to Dr. Josefa Salgado office. Discharging patient from our clinic.

## 2025-01-03 ENCOUNTER — OFFICE VISIT (OUTPATIENT)
Dept: INTERNAL MEDICINE CLINIC | Age: 75
End: 2025-01-03

## 2025-01-03 VITALS
DIASTOLIC BLOOD PRESSURE: 76 MMHG | SYSTOLIC BLOOD PRESSURE: 126 MMHG | WEIGHT: 128.4 LBS | BODY MASS INDEX: 19.46 KG/M2 | HEART RATE: 90 BPM | TEMPERATURE: 97.8 F | HEIGHT: 68 IN | OXYGEN SATURATION: 99 %

## 2025-01-03 DIAGNOSIS — Z13.220 SCREENING FOR HYPERLIPIDEMIA: ICD-10-CM

## 2025-01-03 DIAGNOSIS — Z87.891 PERSONAL HISTORY OF TOBACCO USE: ICD-10-CM

## 2025-01-03 DIAGNOSIS — Z13.6 SCREENING FOR AAA (ABDOMINAL AORTIC ANEURYSM): ICD-10-CM

## 2025-01-03 DIAGNOSIS — Z13.1 SCREENING FOR DIABETES MELLITUS: ICD-10-CM

## 2025-01-03 DIAGNOSIS — I73.9 PAD (PERIPHERAL ARTERY DISEASE) (HCC): ICD-10-CM

## 2025-01-03 DIAGNOSIS — Z00.00 INITIAL MEDICARE ANNUAL WELLNESS VISIT: Primary | ICD-10-CM

## 2025-01-03 DIAGNOSIS — Z76.89 ENCOUNTER TO ESTABLISH CARE: ICD-10-CM

## 2025-01-03 DIAGNOSIS — Z91.81 AT HIGH RISK FOR FALLS: ICD-10-CM

## 2025-01-03 DIAGNOSIS — Z12.11 SCREEN FOR COLON CANCER: ICD-10-CM

## 2025-01-03 DIAGNOSIS — C44.311 BASAL CELL CARCINOMA OF NOSE: ICD-10-CM

## 2025-01-03 DIAGNOSIS — Z12.2 SCREENING FOR LUNG CANCER: ICD-10-CM

## 2025-01-03 DIAGNOSIS — Z86.711 HISTORY OF PULMONARY EMBOLUS (PE): ICD-10-CM

## 2025-01-03 RX ORDER — WARFARIN SODIUM 1 MG/1
1 TABLET ORAL DAILY
Qty: 30 TABLET | Refills: 2 | Status: SHIPPED | OUTPATIENT
Start: 2025-01-03 | End: 2025-04-03

## 2025-01-03 SDOH — ECONOMIC STABILITY: FOOD INSECURITY: WITHIN THE PAST 12 MONTHS, THE FOOD YOU BOUGHT JUST DIDN'T LAST AND YOU DIDN'T HAVE MONEY TO GET MORE.: NEVER TRUE

## 2025-01-03 SDOH — ECONOMIC STABILITY: INCOME INSECURITY: HOW HARD IS IT FOR YOU TO PAY FOR THE VERY BASICS LIKE FOOD, HOUSING, MEDICAL CARE, AND HEATING?: SOMEWHAT HARD

## 2025-01-03 SDOH — ECONOMIC STABILITY: FOOD INSECURITY: WITHIN THE PAST 12 MONTHS, YOU WORRIED THAT YOUR FOOD WOULD RUN OUT BEFORE YOU GOT MONEY TO BUY MORE.: NEVER TRUE

## 2025-01-03 ASSESSMENT — PATIENT HEALTH QUESTIONNAIRE - PHQ9
SUM OF ALL RESPONSES TO PHQ QUESTIONS 1-9: 0
2. FEELING DOWN, DEPRESSED OR HOPELESS: NOT AT ALL
SUM OF ALL RESPONSES TO PHQ QUESTIONS 1-9: 0

## 2025-01-03 ASSESSMENT — ENCOUNTER SYMPTOMS
CONSTIPATION: 0
ABDOMINAL PAIN: 0
DIARRHEA: 0
NAUSEA: 0
WHEEZING: 0
VOMITING: 0
SHORTNESS OF BREATH: 0
CHEST TIGHTNESS: 0
BACK PAIN: 0
TROUBLE SWALLOWING: 0
SORE THROAT: 0
COUGH: 0

## 2025-01-03 NOTE — PATIENT INSTRUCTIONS
agree that screening is for people who have a high risk of lung cancer. But experts don't agree on what high risk means. Some say people age 50 or older with at least a 20-pack-year smoking history are high risk. Others say it's people age 55 or older with a 30-pack-year history.  To see if you could benefit from screening, first find out if you are at high risk for lung cancer. Your doctor can help you decide your lung cancer risk.  What are the risks of screening?  CT screening for lung cancer isn't perfect. It can show an abnormal result when it turns out there wasn't any cancer. This is called a false-positive result. This means you may need more tests to make sure you don't have cancer. These tests can be harmful and cause a lot of worry.  These tests may include more CT scans and invasive testing like a lung biopsy. In a biopsy, the doctor takes a sample of tissue from inside your lung so it can be looked at under a microscope. A biopsy is the only way to tell if you have lung cancer. If the biopsy finds cancer, you and your doctor will have to decide how or whether to treat it.  Some lung cancers found on CT scans are harmless and would not have caused a problem if they had not been found through screening. But because doctors can't tell which ones will turn out to be harmless, most will be treated. This means that you may get treatment--including surgery, radiation, or chemotherapy--that you don't need.  There is a risk of damage to cells or tissue from being exposed to radiation, including the small amounts used in CTs, X-rays, and other medical tests. Over time, exposure to radiation may cause cancer and other health problems. But in most cases, the risk of getting cancer from being exposed to small amounts of radiation is low. It's not a reason to avoid these tests for most people.  What are the benefits of screening?  Your scan may be normal (negative).  For some people who are at higher risk, screening

## 2025-01-03 NOTE — ASSESSMENT & PLAN NOTE
Hx of PAD and s/p amputations of RLE digits 3-5. Used to follow up with Vascular surgery, seen > 1 year ago, lost to follow up. Son to call vascular surgery office to re-establish. Provided with new referral if needed to re-establish. States that he was on warfarin but ran out of medications > 1 year ago, has not taken it since. Refilled. No new symptoms or ulcerations/wounds on his LE's.     S/p right common femoral and superficial femoral artery endarterectomy with bovine pericardial patch angioplasty on December 21, 2023. Follows Coby Stallings MD Vascular Surgery - [facility].       Orders:    Ambulatory referral to Vascular Surgery    warfarin (COUMADIN) 1 MG tablet; Take 1 tablet by mouth daily

## 2025-01-03 NOTE — ASSESSMENT & PLAN NOTE
Orders:    [place] VISIT TO DISCUSS LUNG CA SCREEN W LDCT    CT Lung Screen (Initial/Annual/Baseline); Future

## 2025-01-03 NOTE — PROGRESS NOTES
rash and wound.   Neurological:  Positive for dizziness. Negative for tremors, speech difficulty, weakness, light-headedness, numbness and headaches.   Psychiatric/Behavioral:  Negative for behavioral problems and confusion.           Objective   Physical Exam  Constitutional:       General: He is not in acute distress.     Appearance: Normal appearance. He is not ill-appearing.   HENT:      Head: Normocephalic and atraumatic.   Cardiovascular:      Rate and Rhythm: Normal rate and regular rhythm.      Pulses: Normal pulses.      Heart sounds: Normal heart sounds. No murmur heard.     No friction rub. No gallop.   Pulmonary:      Effort: Pulmonary effort is normal. No respiratory distress.      Breath sounds: Normal breath sounds. No stridor. No wheezing, rhonchi or rales.   Abdominal:      General: Abdomen is flat. Bowel sounds are normal. There is no distension.      Palpations: Abdomen is soft.      Tenderness: There is no abdominal tenderness. There is no guarding.   Musculoskeletal:         General: No swelling or tenderness. Normal range of motion.      Cervical back: No rigidity or tenderness.      Right lower leg: No edema.      Left lower leg: No edema.   Skin:     General: Skin is warm and dry.      Capillary Refill: Capillary refill takes less than 2 seconds.      Coloration: Skin is not jaundiced.      Findings: No rash.   Neurological:      General: No focal deficit present.      Mental Status: He is alert and oriented to person, place, and time. Mental status is at baseline.      Sensory: No sensory deficit.   Psychiatric:         Mood and Affect: Mood normal.         Behavior: Behavior normal.            This dictation was generated by voice recognition computer software.  Although all attempts are made to edit the dictation for accuracy, there may be errors in the transcription that are not intended.    An electronic signature was used to authenticate this note.    --Danish Tam,

## 2025-01-15 ENCOUNTER — TELEPHONE (OUTPATIENT)
Dept: INTERNAL MEDICINE CLINIC | Age: 75
End: 2025-01-15

## 2025-01-15 NOTE — TELEPHONE ENCOUNTER
Ena calling --pt trying to make appt there for CT and US---can you please fax over the CT and US orders Dr Tam put in on 1/3 to [place] at [phone].  Thanks.

## 2025-01-16 ENCOUNTER — TELEPHONE (OUTPATIENT)
Dept: INTERNAL MEDICINE CLINIC | Age: 75
End: 2025-01-16

## 2025-04-03 ENCOUNTER — OFFICE VISIT (OUTPATIENT)
Dept: INTERNAL MEDICINE CLINIC | Age: 75
End: 2025-04-03
Payer: MEDICARE

## 2025-04-03 VITALS
OXYGEN SATURATION: 98 % | SYSTOLIC BLOOD PRESSURE: 136 MMHG | HEIGHT: 68 IN | HEART RATE: 77 BPM | TEMPERATURE: 98 F | BODY MASS INDEX: 19.55 KG/M2 | WEIGHT: 129 LBS | DIASTOLIC BLOOD PRESSURE: 78 MMHG

## 2025-04-03 DIAGNOSIS — J44.9 COPD, SEVERITY TO BE DETERMINED (HCC): ICD-10-CM

## 2025-04-03 DIAGNOSIS — F17.200 SMOKER: ICD-10-CM

## 2025-04-03 DIAGNOSIS — I73.9 PAD (PERIPHERAL ARTERY DISEASE): ICD-10-CM

## 2025-04-03 DIAGNOSIS — I26.99 BILATERAL PULMONARY EMBOLISM (HCC): Primary | ICD-10-CM

## 2025-04-03 DIAGNOSIS — I10 PRIMARY HYPERTENSION: ICD-10-CM

## 2025-04-03 PROCEDURE — 3017F COLORECTAL CA SCREEN DOC REV: CPT

## 2025-04-03 PROCEDURE — G8420 CALC BMI NORM PARAMETERS: HCPCS

## 2025-04-03 PROCEDURE — G8427 DOCREV CUR MEDS BY ELIG CLIN: HCPCS

## 2025-04-03 PROCEDURE — 3075F SYST BP GE 130 - 139MM HG: CPT

## 2025-04-03 PROCEDURE — 1160F RVW MEDS BY RX/DR IN RCRD: CPT

## 2025-04-03 PROCEDURE — 99214 OFFICE O/P EST MOD 30 MIN: CPT

## 2025-04-03 PROCEDURE — 1036F TOBACCO NON-USER: CPT

## 2025-04-03 PROCEDURE — 1123F ACP DISCUSS/DSCN MKR DOCD: CPT

## 2025-04-03 PROCEDURE — 3023F SPIROM DOC REV: CPT

## 2025-04-03 PROCEDURE — 1159F MED LIST DOCD IN RCRD: CPT

## 2025-04-03 PROCEDURE — 3078F DIAST BP <80 MM HG: CPT

## 2025-04-03 RX ORDER — WARFARIN SODIUM 1 MG/1
1 TABLET ORAL DAILY
Qty: 30 TABLET | Refills: 2 | Status: SHIPPED | OUTPATIENT
Start: 2025-04-03 | End: 2025-04-03

## 2025-04-03 RX ORDER — WARFARIN SODIUM 1 MG/1
1 TABLET ORAL DAILY
Qty: 90 TABLET | Refills: 0 | Status: SHIPPED | OUTPATIENT
Start: 2025-04-03 | End: 2025-04-10 | Stop reason: ALTCHOICE

## 2025-04-03 RX ORDER — ALBUTEROL SULFATE 90 UG/1
2 INHALANT RESPIRATORY (INHALATION) 4 TIMES DAILY PRN
Qty: 18 G | Refills: 0 | Status: SHIPPED | OUTPATIENT
Start: 2025-04-03 | End: 2025-07-02

## 2025-04-03 RX ORDER — AMLODIPINE BESYLATE 5 MG/1
5 TABLET ORAL DAILY
Qty: 90 TABLET | Refills: 0 | Status: SHIPPED | OUTPATIENT
Start: 2025-04-03 | End: 2025-07-02

## 2025-04-03 SDOH — ECONOMIC STABILITY: FOOD INSECURITY: WITHIN THE PAST 12 MONTHS, THE FOOD YOU BOUGHT JUST DIDN'T LAST AND YOU DIDN'T HAVE MONEY TO GET MORE.: NEVER TRUE

## 2025-04-03 SDOH — ECONOMIC STABILITY: FOOD INSECURITY: WITHIN THE PAST 12 MONTHS, YOU WORRIED THAT YOUR FOOD WOULD RUN OUT BEFORE YOU GOT MONEY TO BUY MORE.: NEVER TRUE

## 2025-04-03 NOTE — PROGRESS NOTES
CALCIUM 8.1 12/28/2017 04:57 AM     Lab Results   Component Value Date/Time    WBC 4.1 08/11/2021 12:01 PM    WBC 8.6 12/29/2017 05:03 AM    WBC 9.8 12/28/2017 04:57 AM    HGB 14.5 08/11/2021 12:01 PM    HGB 15.5 12/29/2017 05:03 AM    HGB 16.5 12/28/2017 04:57 AM    HCT 43.7 08/11/2021 12:01 PM    HCT 45.3 12/29/2017 05:03 AM    HCT 48.1 12/28/2017 04:57 AM    MCV 95.0 08/11/2021 12:01 PM    .2 12/29/2017 05:03 AM    .0 12/28/2017 04:57 AM     08/11/2021 12:01 PM     12/29/2017 05:03 AM     12/28/2017 04:57 AM     No results found for: \"CHOL\", \"TRIG\", \"HDL\", \"LDLDIRECT\"        4/3/2025     1:35 PM 1/3/2025     9:59 AM 8/11/2021     6:00 PM   Vitals   SYSTOLIC 136 126 164   DIASTOLIC 78 76 75   Pulse 77 90    Temp 98 °F (36.7 °C) 97.8 °F (36.6 °C)    Respirations   13   SpO2 98 % 99 % 100 %   Weight - Scale 129 lb 128 lb 6.4 oz    Height 5' 8\" 5' 8\"    Body Mass Index 19.61 kg/m2 19.52 kg/m2          Review of Systems   As per HPI    Objective   Physical Exam  Constitutional:       General: He is not in acute distress.     Appearance: Normal appearance. He is not ill-appearing.   HENT:      Head: Normocephalic and atraumatic.   Cardiovascular:      Rate and Rhythm: Normal rate and regular rhythm.      Pulses: Normal pulses.      Heart sounds: Normal heart sounds. No murmur heard.     No friction rub. No gallop.   Pulmonary:      Effort: Pulmonary effort is normal. No respiratory distress.      Breath sounds: Normal breath sounds. No stridor. No wheezing, rhonchi or rales.   Abdominal:      General: Abdomen is flat. Bowel sounds are normal. There is no distension.      Palpations: Abdomen is soft.      Tenderness: There is no abdominal tenderness. There is no guarding.   Musculoskeletal:         General: Normal range of motion.      Cervical back: No rigidity or tenderness.      Right lower leg: No edema.      Left lower leg: No edema.   Skin:     General: Skin is warm and dry.

## 2025-04-07 ENCOUNTER — TELEPHONE (OUTPATIENT)
Dept: PHARMACY | Age: 75
End: 2025-04-07

## 2025-04-07 NOTE — TELEPHONE ENCOUNTER
Received electronic referral from Dr. Tam for pt to being warfarin management in clinic. Pt hx came to clinic for INR management until 2021.     Called and spoke to patient. He does not remember coming in to the clinic for INR management. Discussed importance of INR being managed for warfarin to be adjusted. Pt stated I dont know multiple times. Refused to make appt for INR clinic stating he will make an appt when he is ready. Reviewed need for INR management.     Explained will send message to referring physician to let know pt refused to make appt for INR management.

## 2025-04-08 ENCOUNTER — TELEPHONE (OUTPATIENT)
Dept: INTERNAL MEDICINE CLINIC | Age: 75
End: 2025-04-08

## 2025-04-08 NOTE — ASSESSMENT & PLAN NOTE
Active tobacco use disorder. Counseled on cessation. Declines resources at this time. Stable, not in acute exacerbation.     Orders:    albuterol sulfate HFA (VENTOLIN HFA) 108 (90 Base) MCG/ACT inhaler; Inhale 2 puffs into the lungs 4 times daily as needed for Wheezing

## 2025-04-08 NOTE — TELEPHONE ENCOUNTER
I just received a message from  stating he would like the pt to make a appointment to see him rather tomorrow or Thursday for a INR  check and to discuss his medications (eliquis and warfarin) and also needed a pharmacy med recc for him as well , I did try to call and schedule they did not answer so I lvm .

## 2025-04-08 NOTE — ASSESSMENT & PLAN NOTE
/78. On amlodipine 5 mg daily.     Orders:    amLODIPine (NORVASC) 5 MG tablet; Take 1 tablet by mouth daily

## 2025-04-08 NOTE — ASSESSMENT & PLAN NOTE
Hx of recurrent DVT and PE. Of note, patient not sure if he is on warfarin or eliquis.  He states that he has not taken his warfarin in a over a year. Denies being on eliquis either. However, current medication list shows warfarin as active medication.    However, per oncology records patient on eliquis 5 mg BID. Will try to clarify with pharmacy and obtain accurate med recc. Patient's INR is currently subtherapeutic and have recommended patient to increase warfarin dose on 2 mg on Tues and Sunday and take 1 mg on remaining days of the week. Have referred patient to anticoagulation clinic for INR checks and warfarin dosing. Have ordered INR checks.     Advised patient to have close follow up his care team including oncology and vascular surgery. Also advised to follow up closely with anticoagulation clinic.     Orders:    POCT INR    Protime-INR; [facility] Medication [facility] (Anticoagulation) - [place]

## 2025-04-08 NOTE — ASSESSMENT & PLAN NOTE
PAD status post amputations and right common femoral and superficial femoral artery endarterectomy with bovine pericardial patch angioplasty on December 21, 2023. Follows Coby Stallings MD Vascular Surgery - [facility].  Recently admitted for gangrene of toe of R foot. See above for anticoagulation.     Orders:    warfarin (COUMADIN) 1 MG tablet; Take 1 tablet by mouth daily Take 1 mg on M, W, F, Saturday  Take 2 mg on Tu and Sunday

## 2025-04-09 NOTE — TELEPHONE ENCOUNTER
Dr. Tam talk to pt about importance of appt for inr\"s, and to discuss his meds. Pt was unable to make appt. I will try to call pt again on Friday to get him scheduled

## 2025-04-16 ENCOUNTER — TELEPHONE (OUTPATIENT)
Dept: INTERNAL MEDICINE CLINIC | Age: 75
End: 2025-04-16

## 2025-05-28 ENCOUNTER — TELEPHONE (OUTPATIENT)
Dept: INTERNAL MEDICINE CLINIC | Age: 75
End: 2025-05-28